# Patient Record
Sex: MALE | Race: WHITE | URBAN - METROPOLITAN AREA
[De-identification: names, ages, dates, MRNs, and addresses within clinical notes are randomized per-mention and may not be internally consistent; named-entity substitution may affect disease eponyms.]

---

## 2018-05-08 NOTE — PATIENT INSTRUCTIONS
Welcome to [facility], where we are committed to the art of inspired primary care.  Thank you for choosing us to be a part of your well-being.    The clinic is open Monday through Friday, 8:00 a.m.   5:00 p.m., and Saturdays from 8:00 a.m.   12:00 p.m.  After-hours questions are directed to our 24-hour nurse line, which can be reached by calling the clinic at [phone].  You can also contact the clinic through [employer], our online patient portal.  (Please allow 1-2 business days for a response via [employer].)  If you are not already enrolled in [employer], access instructions are below.    If you need a refill on your prescription, please contact the pharmacy where you filled it, and they will contact our clinic with the details of what is needed.  If your prescription is a controlled substance, you will have a conversation with your provider to determine if you would like to  your prescription at the clinic or have it mailed to your pharmacy.  Please allow 2-3 business days for all refill requests to be handled.    We look forward to providing you with great care!  Please let me know if you have any questions.  Thank you for allowing me to participate in your care.  It was my pleasure meeting you.  Naina Antunez PA-C    Preventive Health Recommendations  Male Ages 40 to 49    Yearly exam:             See your health care provider every year in order to  o   Review health changes.   o   Discuss preventive care.    o   Review your medicines if your doctor has prescribed any.    You should be tested each year for STDs (sexually transmitted diseases) if you re at risk.     Have a cholesterol test every 5 years.     Have a colonoscopy (test for colon cancer) if someone in your family has had colon cancer or polyps before age 50.     After age 45, have a diabetes test (fasting glucose). If you are at risk for diabetes, you should have this test every 3 years.      Talk with your  health care provider about whether or not a prostate cancer screening test (PSA) is right for you.    Shots: Get a flu shot each year. Get a tetanus shot every 10 years.     Nutrition:    Eat at least 5 servings of fruits and vegetables daily.     Eat whole-grain bread, whole-wheat pasta and brown rice instead of white grains and rice.     Talk to your provider about Calcium and Vitamin D.     Lifestyle    Exercise for at least 150 minutes a week (30 minutes a day, 5 days a week). This will help you control your weight and prevent disease.     Limit alcohol to one drink per day.     No smoking.     Wear sunscreen to prevent skin cancer.     See your dentist every six months for an exam and cleaning.

## 2018-05-08 NOTE — PROGRESS NOTES
SUBJECTIVE:   CC: Faisal Estes is an 47 year old male new patient who presents for preventative health visit. His last CPE was 4 years ago at [facility].      He has no specific concerns other than making sure he is up to date on health maintenance and will be turning 48 this year.    Healthy Habits:    Do you get at least three servings of calcium containing foods daily (dairy, green leafy vegetables, etc.)? no, taking calcium and/or vitamin D supplement: yes -     Amount of exercise or daily activities, outside of work: 3 day(s) per week 45-60 minutes    Problems taking medications regularly not applicable    Medication side effects: No    Have you had an eye exam in the past two years? No-planning to make an appointment    Do you see a dentist twice per year? yes    Do you have sleep apnea, excessive snoring or daytime drowsiness?no    Today's PHQ-2 Score:   PHQ-2 (  [employer]) 2018   Q1: Little interest or pleasure in doing things 0   Q2: Feeling down, depressed or hopeless 0   PHQ-2 Score 0       There are no active problems to display for this patient.      History reviewed. No pertinent past medical history.    Past Surgical History:   Procedure Laterality Date     ORTHOPEDIC SURGERY Left 2003    Medial knee replacement due to rugby injury      right wrist ganglion      30 years ago     TONSILLECTOMY       VASECTOMY         Family History   Problem Relation Age of Onset     CANCER Maternal Grandfather 70     stomach     Coronary Artery Disease Paternal Grandfather 70       Social History   Substance Use Topics     Smoking status: Never Smoker     Smokeless tobacco: Never Used     Alcohol use 3.0 oz/week     5 Standard drinks or equivalent per week      Comment: 5 drinks a week       Social History     Social History Narrative    Live with 3 children and wife. MOther in law was living with them but she recently .  One dog. Has his own marketing business.  Originally from [place].   "[place]oved [place] 6 [place].        Has a good support system.    Feels safe in all environments.    Wears seatbelt 100% of the time    Wears helmet while biking.    Dentist regular exams    Eye doctor regular visits. Wears glasses.    Mild snoring but no observed apnea.    Denies history of abuse, past or present, physical, sexual or emotional.    05/21/18    Naina Antunez PA-C           No current outpatient prescriptions on file.       Last PSA: No results found for: PSA    Reviewed orders with patient. Reviewed health maintenance and updated orders accordingly - Yes    Reviewed and updated as needed this visit by clinical staff  Tobacco  Allergies  Meds  Problems  Med Hx  Surg Hx  Fam Hx  Soc Hx        Reviewed and updated as needed this visit by Provider  Tobacco  Allergies  Meds  Problems  Med Hx  Surg Hx  Fam Hx  Soc Hx           ROS:  CONSTITUTIONAL: NEGATIVE for fever, chills, change in weight  INTEGUMENTARY/SKIN: NEGATIVE for worrisome rashes, moles or lesions  EYES: NEGATIVE for vision changes or irritation  ENT: NEGATIVE for ear, mouth and throat problems  RESP: NEGATIVE for significant cough or SOB  CV: NEGATIVE for chest pain, palpitations or peripheral edema  GI: NEGATIVE for nausea, abdominal pain, heartburn, or change in bowel habits   male: negative for dysuria, hematuria, decreased urinary stream, erectile dysfunction, urethral discharge  MUSCULOSKELETAL: NEGATIVE for significant arthralgias or myalgia  NEURO: NEGATIVE for weakness, dizziness or paresthesias  ENDOCRINE: NEGATIVE for temperature intolerance, skin/hair changes  HEME/ALLERGY/IMMUNE: NEGATIVE for bleeding problems  PSYCHIATRIC: NEGATIVE for changes in mood or affect  M: NEGATIVE for significant arthralgias or myalgia  N: NEGATIVE for weakness, dizziness or paresthesias  P: NEGATIVE for changes in mood or affect    OBJECTIVE:   /70  Pulse (!) 49  Temp 98.1  F (36.7  C) (Oral)  Ht 5' 10.32\" (178.6 cm)  Wt 198 lb 4 " oz (89.9 kg)  SpO2 98%  BMI 28.19 kg/m2  EXAM:  GENERAL: healthy, alert and no distress  EYES: Eyes grossly normal to inspection, PERRL and conjunctivae and sclerae normal.  Pinguecula bilaterally  HENT: ear canals and TM's normal, nose and mouth without ulcers or lesions  NECK: no adenopathy, no asymmetry, masses, or scars and thyroid normal to palpation. No Bruits  RESP: lungs clear to auscultation - no rales, rhonchi or wheezes  CV: regular rate and rhythm, normal S1 S2, no S3 or S4, no murmur, click or rub, no peripheral edema and peripheral pulses strong  ABDOMEN: soft, nontender, no hepatosplenomegaly, no masses and bowel sounds normal  MS: no gross musculoskeletal defects noted, no clubbing, edema or cyanosis of extremities.  Pulses = and appropriate bilaterally to DP and PT  SKIN: no suspicious lesions or rashes.  Multiple nevi . Seborrheic keratosis on back.  NEURO: Normal strength and tone, mentation intact and speech normal  PSYCH: mentation appears normal, affect normal/bright  LYMPH: no cervical, supraclavicular, axillary, or inguinal adenopathy    ASSESSMENT/PLAN:       ICD-10-CM    1. Routine general medical examination at a health care facility Z00.00 Comprehensive Metabolic Panel ([place])     Prostate spec antigen screen     Urinalysis ([place])             2. Screening for lipid disorders Z13.220 Lipid Profile              3. Special screening for malignant neoplasms, colon Z12.11    Recommend dermatology surveillance and skin check due to history of repeated sun burns and sun exposures as a child and young adult in [facility]. Derm clinic scheduling information given.    COUNSELING:  Reviewed preventive health counseling, as reflected in patient instructions  Special attention given to:        Regular exercise       Healthy diet/nutrition       Vision screening       Immunizations  TDAP up to date. Discussed Shingrix--wait until 50        Colon cancer screening--Cologard Rx given.        "Prostate cancer screening       Osteoporosis Prevention/Bone Health       Advance Care Planning-Information given and discussed       reports that he has never smoked. He has never used smokeless tobacco.    Estimated body mass index is 28.19 kg/(m^2) as calculated from the following:    Height as of this encounter: 5' 10.32\" (178.6 cm).    Weight as of this encounter: 198 lb 4 oz (89.9 kg).   Weight management plan: Discussed healthy diet and exercise guidelines and patient will follow up in 12 months in clinic to re-evaluate.    Counseling Resources:  ATP IV Guidelines  Pooled Cohorts Equation Calculator  FRAX Risk Assessment  ICSI Preventive Guidelines  Dietary Guidelines for Americans, 2010  USDA's MyPlate  ASA Prophylaxis  Lung CA Screening    Gaby Antunez PA-C  [facility]  "

## 2018-05-21 ENCOUNTER — OFFICE VISIT (OUTPATIENT)
Dept: FAMILY MEDICINE | Facility: CLINIC | Age: 48
End: 2018-05-21
Payer: COMMERCIAL

## 2018-05-21 VITALS
DIASTOLIC BLOOD PRESSURE: 70 MMHG | WEIGHT: 198.25 LBS | SYSTOLIC BLOOD PRESSURE: 113 MMHG | OXYGEN SATURATION: 98 % | BODY MASS INDEX: 28.38 KG/M2 | TEMPERATURE: 98.1 F | HEART RATE: 49 BPM | HEIGHT: 70 IN

## 2018-05-21 DIAGNOSIS — Z00.00 ROUTINE GENERAL MEDICAL EXAMINATION AT A HEALTH CARE FACILITY: Primary | ICD-10-CM

## 2018-05-21 DIAGNOSIS — Z13.220 SCREENING FOR LIPID DISORDERS: ICD-10-CM

## 2018-05-21 DIAGNOSIS — Z12.11 SPECIAL SCREENING FOR MALIGNANT NEOPLASMS, COLON: ICD-10-CM

## 2018-05-21 LAB
ALBUMIN SERPL-MCNC: 4.2 G/DL (ref 3.3–4.6)
ALP SERPL-CCNC: 58 U/L (ref 40–150)
ALT SERPL-CCNC: 28 U/L (ref 0–70)
AST SERPL-CCNC: 33 U/L (ref 0–55)
BILIRUB SERPL-MCNC: 0.7 MG/DL (ref 0.2–1.3)
BILIRUBIN UR: NEGATIVE
BLOOD UR: NEGATIVE
BUN SERPL-MCNC: 15 MG/DL (ref 5–24)
CALCIUM SERPL-MCNC: 9.2 MG/DL (ref 8.5–10.4)
CHLORIDE SERPLBLD-SCNC: 103 MMOL/L (ref 94–109)
CHOLEST SERPL-MCNC: 179 MG/DL
CO2 SERPL-SCNC: 29 MMOL/L (ref 20–32)
CREAT SERPL-MCNC: 1.1 MG/DL (ref 0.8–1.5)
EGFR CALCULATED (BLACK REFERENCE): 91.5
EGFR CALCULATED (NON BLACK REFERENCE): 75.6
GLUCOSE SERPL-MCNC: 94 MG/DL (ref 60–109)
GLUCOSE URINE: NEGATIVE
HDLC SERPL-MCNC: 45 MG/DL
KETONES UR QL: ABNORMAL
LDLC SERPL CALC-MCNC: 116 MG/DL
LEUKOCYTE ESTERASE UR: NEGATIVE
NITRITE UR QL STRIP: NEGATIVE
NONHDLC SERPL-MCNC: 134 MG/DL
PH UR STRIP: 5.5 [PH] (ref 5–7)
POTASSIUM SERPL-SCNC: 4 MMOL/L (ref 3.4–5.3)
PROT SERPL-MCNC: 7.8 G/DL (ref 6.8–8.8)
PROTEIN UR: NEGATIVE
PSA SERPL-ACNC: 2.06 UG/L (ref 0–4)
SODIUM SERPL-SCNC: 141 MMOL/L (ref 133–144)
SP GR UR STRIP: >=1.03
TRIGL SERPL-MCNC: 91 MG/DL
UROBILINOGEN UR STRIP-ACNC: ABNORMAL

## 2018-05-21 NOTE — MR AVS SNAPSHOT
After Visit Summary   5/21/2018    Faisal Estes    MRN: [id number]           Patient Information     Date Of Birth          1970        Visit Information        Provider Department      5/21/2018 10:00 AM Gaby Antunez PA-C [facility]        Today's Diagnoses     Routine general medical examination at a health care facility    -  1    Screening for lipid disorders        Screening for human immunodeficiency virus        Special screening for malignant neoplasms, colon          Care Instructions    Welcome to [facility], where we are committed to the art of inspired primary care.  Thank you for choosing us to be a part of your well-being.    The clinic is open Monday through Friday, 8:00 a.m. - 5:00 p.m., and Saturdays from 8:00 a.m. - 12:00 p.m.  After-hours questions are directed to our 24-hour nurse line, which can be reached by calling the clinic at [phone].  You can also contact the clinic through [employer], our online patient portal.  (Please allow 1-2 business days for a response via [employer].)  If you are not already enrolled in [employer], access instructions are below.    If you need a refill on your prescription, please contact the pharmacy where you filled it, and they will contact our clinic with the details of what is needed.  If your prescription is a controlled substance, you will have a conversation with your provider to determine if you would like to  your prescription at the clinic or have it mailed to your pharmacy.  Please allow 2-3 business days for all refill requests to be handled.    We look forward to providing you with great care!  Please let me know if you have any questions.  Thank you for allowing me to participate in your care.  It was my pleasure meeting you.  Naina Antunez PA-C    Preventive Health Recommendations  Male Ages 40 to 49    Yearly exam:             See your health care provider every year in  order to  o   Review health changes.   o   Discuss preventive care.    o   Review your medicines if your doctor has prescribed any.    You should be tested each year for STDs (sexually transmitted diseases) if you re at risk.     Have a cholesterol test every 5 years.     Have a colonoscopy (test for colon cancer) if someone in your family has had colon cancer or polyps before age 50.     After age 45, have a diabetes test (fasting glucose). If you are at risk for diabetes, you should have this test every 3 years.      Talk with your health care provider about whether or not a prostate cancer screening test (PSA) is right for you.    Shots: Get a flu shot each year. Get a tetanus shot every 10 years.     Nutrition:    Eat at least 5 servings of fruits and vegetables daily.     Eat whole-grain bread, whole-wheat pasta and brown rice instead of white grains and rice.     Talk to your provider about Calcium and Vitamin D.     Lifestyle    Exercise for at least 150 minutes a week (30 minutes a day, 5 days a week). This will help you control your weight and prevent disease.     Limit alcohol to one drink per day.     No smoking.     Wear sunscreen to prevent skin cancer.     See your dentist every six months for an exam and cleaning.              Follow-ups after your visit        Who to contact     Please call your clinic at [phone] to:    Ask questions about your health    Make or cancel appointments    Discuss your medicines    Learn about your test results    Speak to your doctor            Additional Information About Your Visit        [employer] Information     [employer] gives you secure access to your electronic health record. If you see a primary care provider, you can also send messages to your care team and make appointments. If you have questions, please call your primary care clinic.  If you do not have a primary care provider, please call [phone] and they will assist you.      [employer] is an electronic  "gateway that provides easy, online access to your medical records. With [employer], you can request a clinic appointment, read your test results, renew a prescription or communicate with your care team.     To access your existing account, please contact your [facility] or call [phone] for assistance.        Care EveryWhere ID     This is your Care EveryWhere ID. This could be used by other organizations to access your [facility] medical records  [id number]        Your Vitals Were     Pulse Temperature Height Pulse Oximetry BMI (Body Mass Index)       49 98.1  F (36.7  C) (Oral) 5' 10.32\" (178.6 cm) 98% 28.19 kg/m2        Blood Pressure from Last 3 Encounters:   05/21/18 113/70    Weight from Last 3 Encounters:   05/21/18 198 lb 4 oz (89.9 kg)              We Performed the Following     Comprehensive Metabolic Panel ([place])     Lipid Panel ([facility])     Prostate spec antigen screen     Urinalysis ([place])        Primary Care Provider Office Phone # Fax #    Gaby Antunez PA-C [phone]       [address] [place] [id number]        Equal Access to Services     [facility] Center[facility] : Hadii aad ku hadasho Soomaali, waaxda luqadaha, qaybta kaalmada adeegyada, kris milian . So [facility] [phone].    ATENCIÓN: Si habla español, tiene a ayers disposición servicios gratuitos de asistencia lingüística. Freya al [phone].    We comply with applicable federal civil rights laws and [place] laws. We do not discriminate on the basis of race, color, national origin, age, disability, sex, sexual orientation, or gender identity.            Thank you!     Thank you for choosing [facility]  for your care. Our goal is always to provide you with excellent care. Hearing back from our patients is one way we can continue to improve our services. Please take a few minutes to complete the written survey that you may receive in the mail after " your visit with us. Thank you!             Your Updated Medication List - Protect others around you: Learn how to safely use, store and throw away your medicines at www.disposemymeds.org.      Notice  As of 5/21/2018 11:03 AM    You have not been prescribed any medications.

## 2018-07-11 ENCOUNTER — TELEPHONE (OUTPATIENT)
Dept: DERMATOLOGY | Facility: CLINIC | Age: 48
End: 2018-07-11

## 2018-07-11 NOTE — TELEPHONE ENCOUNTER
Dermatology Pre-visit Call:    Reason for visit : Skin Check      Any personal history of skin cancers: No    Was the patient referred: Yes    If the patient was referred, are records obtained: No    Has the patient seen a dermatologist in the past: No    Patient Reminders Given:  --Please, make sure you bring an updated list of your medications.   --Plan on being in our facility for approximately one hour, this includes the registration process, office visit, education and check-out process.  If you are having a procedure, more time may be required.     --If you are having a procedure, please, present 15 minutes early.  --Location reviewed.   --If you need to cancel or reschedule, call XXXX  --We look forward to seeing you in Dermatology Clinic.

## 2018-07-18 ENCOUNTER — OFFICE VISIT (OUTPATIENT)
Dept: DERMATOLOGY | Facility: CLINIC | Age: 48
End: 2018-07-18
Payer: COMMERCIAL

## 2018-07-18 DIAGNOSIS — D48.5 NEOPLASM OF UNCERTAIN BEHAVIOR OF SKIN: Primary | ICD-10-CM

## 2018-07-18 DIAGNOSIS — D22.9 MULTIPLE BENIGN NEVI: ICD-10-CM

## 2018-07-18 DIAGNOSIS — L82.1 SEBORRHEIC KERATOSES: ICD-10-CM

## 2018-07-18 RX ORDER — LIDOCAINE HYDROCHLORIDE AND EPINEPHRINE 10; 10 MG/ML; UG/ML
1 INJECTION, SOLUTION INFILTRATION; PERINEURAL ONCE
Qty: 1 ML | Refills: 0 | OUTPATIENT
Start: 2018-07-18 | End: 2018-07-18

## 2018-07-18 ASSESSMENT — PAIN SCALES - GENERAL
PAINLEVEL: NO PAIN (0)
PAINLEVEL: NO PAIN (0)

## 2018-07-18 NOTE — NURSING NOTE
Dermatology Rooming Note    Faisal Estes's goals for this visit include:   Chief Complaint   Patient presents with     Skin Check     Faisal is here today for a skin check- no areas of concern.      Jess Ayala MA

## 2018-07-18 NOTE — NURSING NOTE
Lidocaine 1%  0.5mL once for one use, starting 7/18/2018 ending 7/18/2018,  2mL disp, R-0, injection  Injected by Jess Ayala MA

## 2018-07-18 NOTE — PATIENT INSTRUCTIONS

## 2018-07-18 NOTE — MR AVS SNAPSHOT
After Visit Summary   7/18/2018    Faisal Estes    MRN: [id number]           Patient Information     Date Of Birth          1970        Visit Information        Provider Department      7/18/2018 2:00 PM Meghan Hodgson PA-C [facility] D[facility]        Today's Diagnoses     Neoplasm of uncertain behavior of skin    -  1      Care Instructions    Wound Care After a Biopsy    What is a skin biopsy?  A skin biopsy allows the doctor to examine a very small piece of tissue under the microscope to determine the diagnosis and the best treatment for the skin condition. A local anesthetic (numbing medicine)  is injected with a very small needle into the skin area to be tested. A small piece of skin is taken from the area. Sometimes a suture (stitch) is used.     What are the risks of a skin biopsy?  I will experience scar, bleeding, swelling, pain, crusting and redness. I may experience incomplete removal or recurrence. Risks of this procedure are excessive bleeding, bruising, infection, nerve damage, numbness, thick (hypertrophic or keloidal) scar and non-diagnostic biopsy.    How should I care for my wound for the first 24 hours?    Keep the wound dry and covered for 24 hours    If it bleeds, hold direct pressure on the area for 15 minutes. If bleeding does not stop then go to the emergency room    Avoid strenuous exercise the first 1-2 days or as your doctor instructs you    How should I care for the wound after 24 hours?    After 24 hours, remove the bandage    You may bathe or shower as normal    If you had a scalp biopsy, you can shampoo as usual and can use shower water to clean the biopsy site daily    Clean the wound twice a day with gentle soap and water    Do not scrub, be gentle    Apply white petroleum/Vaseline after cleaning the wound with a cotton swab or a clean finger, and keep the site covered with a Bandaid /bandage. Bandages are not necessary with a scalp biopsy    If you are  unable to cover the site with a Bandaid /bandage, re-apply ointment 2-3 times a day to keep the site moist. Moisture will help with healing    Avoid strenuous activity for first 1-2 days    Avoid lakes, rivers, pools, and oceans until the stitches are removed or the site is healed    How do I clean my wound?    Wash hands thoroughly with soap or use hand  before all wound care    Clean the wound with gentle soap and water    Apply white petroleum/Vaseline  to wound after it is clean    Replace the Bandaid /bandage to keep the wound covered for the first few days or as instructed by your doctor    If you had a scalp biopsy, warm shower water to the area on a daily basis should suffice    What should I use to clean my wound?     Cotton-tipped applicators (Qtips )    White petroleum jelly (Vaseline ). Use a clean new container and use Q-tips to apply.    Bandaids   as needed    Gentle soap     How should I care for my wound long term?    Do not get your wound dirty    Keep up with wound care for one week or until the area is healed.    A small scab will form and fall off by itself when the area is completely healed. The area will be red and will become pink in color as it heals. Sun protection is very important for how your scar will turn out. Sunscreen with an SPF 30 or greater is recommended once the area is healed.    You should have some soreness but it should be mild and slowly go away over several days. Talk to your doctor about using tylenol for pain,    When should I call my doctor?  If you have increased:     Pain or swelling    Pus or drainage (clear or slightly yellow drainage is ok)    Temperature over 100F    Spreading redness or warmth around wound    When will I hear about my results?  The biopsy results can take 2-3 weeks to come back. The clinic will call you with the results, send you a [employer] message, or have you schedule a follow-up clinic or phone time to discuss the results. Contact our  clinics if you do not hear from us in 3 weeks.     Who should I call with questions?    [facility]: [phone]     [facility]: [phone]    For urgent needs outside of business hours call the [facility] at [phone] and ask for the dermatology resident on call              Follow-ups after your visit        Who to contact     Please call your clinic at [phone] to:    Ask questions about your health    Make or cancel appointments    Discuss your medicines    Learn about your test results    Speak to your doctor            Additional Information About Your Visit        [employer] Information     [employer] gives you secure access to your electronic health record. If you see a primary care provider, you can also send messages to your care team and make appointments. If you have questions, please call your primary care clinic.  If you do not have a primary care provider, please call [phone] and they will assist you.      [employer] is an electronic gateway that provides easy, online access to your medical records. With [employer], you can request a clinic appointment, read your test results, renew a prescription or communicate with your care team.     To access your existing account, please contact your [facility] or call [phone] for assistance.        Care EveryWhere ID     This is your Care EveryWhere ID. This could be used by other organizations to access your [facility] medical records  [id number]         Blood Pressure from Last 3 Encounters:   05/21/18 113/70    Weight from Last 3 Encounters:   05/21/18 89.9 kg (198 lb 4 oz)              We Performed the Following     BIOPSY SKIN/SUBQ/MUC MEM, SINGLE LESION     Dermatological path order and indications          Today's Medication Changes          These changes are accurate as of 7/18/18  2:56 PM.  If you have any questions, ask your nurse or doctor.                Start taking these medicines.        Dose/Directions    lidocaine 1% with EPINEPHrine 1:100,000 1 %-1:042841 injection   Used for:  Neoplasm of uncertain behavior of skin   Started by:  Meghan Hodgson PA-C        Dose:  1 mL   Inject 1 mL into the skin once for 1 dose   Quantity:  1 mL   Refills:  0            Where to get your medicines      Some of these will need a paper prescription and others can be bought over the counter.  Ask your nurse if you have questions.     You don't need a prescription for these medications     lidocaine 1% with EPINEPHrine 1:100,000 1 %-1:193805 injection                Primary Care Provider Office Phone # Fax #    Gaby A EMERSON Antunez [phone]       [address] [place] [address]        Equal Access to Services     RAI TUCKER : Rajan henryo Sobyron, waaxda luqadaha, qaybta kaalmada ademichelleyada, kris milian . [facility] [phone].    ATENCIÓN: Si habla español, tiene a ayers disposición servicios gratuitos de asistencia lingüística. [facility] [phone].    We comply with applicable federal civil rights laws and [place] laws. We do not discriminate on the basis of race, color, national origin, age, disability, sex, sexual orientation, or gender identity.            Thank you!     Thank you for choosing [facility] DERMATOLOGY  for your care. Our goal is always to provide you with excellent care. Hearing back from our patients is one way we can continue to improve our services. Please take a few minutes to complete the written survey that you may receive in the mail after your visit with us. Thank you!             Your Updated Medication List - Protect others around you: Learn how to safely use, store and throw away your medicines at www.disposemymeds.org.          This list is accurate as of 7/18/18  2:56 PM.  Always use your most recent med list.                   Brand Name Dispense Instructions for  use Diagnosis    lidocaine 1% with EPINEPHrine 1:100,000 1 %-1:537668 injection     1 mL    Inject 1 mL into the skin once for 1 dose    Neoplasm of uncertain behavior of skin

## 2018-07-18 NOTE — PROGRESS NOTES
"[facility] Dermatology Note      Dermatology Problem List:  1.NUB - s/p bx 7/18/18    CC:   Chief Complaint   Patient presents with     Skin Check     Faisal is here today for a skin check- no areas of concern.          Encounter Date: Jul 18, 2018    History of Present Illness:  Mr. Faisal Estes is a 47 year old male who is new to the clinic who presents for a FBSE. He has no specific concerns. His PCP recommended he have a skin exam because noticed a few \"things on his back\" that should be checked. The patient denies painful, itching, tingling or bleeding lesions unless otherwise noted. He has no hx of skin cancer. He is otherwise doing well.       Past Medical History:   There is no problem list on file for this patient.    History reviewed. No pertinent past medical history.  Past Surgical History:   Procedure Laterality Date     ORTHOPEDIC SURGERY Left 2003    Medial knee replacement due to rugby injury      right wrist ganglion      30 years ago     TONSILLECTOMY       VASECTOMY         Social History:  The patient works as a . The patient denies use of tanning beds.    Family History:  There is no family history of skin cancer. There is no family history of melanoma.    Medications:  No current outpatient prescriptions on file.     No Known Allergies      Review of Systems:  -Constitutional: The patient denies fatigue, fevers, chills, unintended weight loss, and night sweats.  -Skin: As above in HPI. No additional skin concerns.  -Heme/Lymph: no concerning bumps, no bleeding or bruising problems    Physical exam:  Vitals: There were no vitals taken for this visit.  GEN: This is a well developed, well-nourished male in no acute distress, in a pleasant mood.    SKIN: Full skin, which includes the head/face, both arms, chest, back, abdomen,both legs, genitalia and/or groin buttocks, digits and/or nails, was examined.  -There is a waxy stuck on tan to brown papule on " the right mid back.  -Multiple regular brown pigmented macules and papules are identified on the trunk and extremities.   -Alvarez's skin type II,   -No other lesions of concern on areas examined.     Impression/Plan:  1. Neoplasm of uncertain behavior on the left mid lower back. The differential diagnosis includes MM vs DN vs other benign nevus.     Shave biopsy:  After discussion of benefits and risks including but not limited to bleeding/bruising, pain/swelling, infection, scar, incomplete removal, nerve damage/numbness, recurrence, and non-diagnostic biopsy, written consent, verbal consent and photographs were obtained. Time-out was performed. The area was cleaned with isopropyl alcohol.  was injected to obtain adequate anesthesia of the lesion on the mid lower back. 1ml of 1% lidocaine was injected to obtain adequate anesthesia. A  shave biopsy was performed. Hemostasis was achieved with aluminium chloride. Vaseline and a sterile dressing were applied. The patient tolerated the procedure and no complications were noted. The patient was provided with verbal and written post care instructions.      2. Multiple clinically benign nevi on the trunk and extremities    ABCD's of melanoma were reviewed with patient and handout provided.     Sunscreen: Apply 20 minutes prior to going outdoors and reapply every two hours, when wet or sweating. We recommend using an SPF 30 or higher, and to use one that is water resistant.       3. Seborrheic keratosis, non irritated - right mid back    No further intervention required. Patient to report changes.     CC Dr. Arcos on close of this encounter.  Follow-up in 1 year, earlier for new or changing lesions.       Staff Involved:  Staff Only  All risks, benefits and alternatives were discussed with patient.  Patient is in agreement and understands the assessment and plan.  All questions were answered.    Meghan Hodgson PA-C  [facility]  [facility]: Phone: [phone], Fax: [phone]

## 2018-07-18 NOTE — LETTER
"7/18/2018       RE: Faisal Estes  [address] [place] [address]     Dear Colleague,    Thank you for referring your patient, Faisal Estes, to the [facility] DERMATOLOGY at [facility]. Please see a copy of my visit note below.    [facility] Dermatology Note      Dermatology Problem List:  1.NUB - s/p bx 7/18/18    CC:   Chief Complaint   Patient presents with     Skin Check     Faisal is here today for a skin check- no areas of concern.          Encounter Date: Jul 18, 2018    History of Present Illness:  Mr. Faisal Estes is a 47 year old male who is new to the clinic who presents for a FBSE. He has no specific concerns. His PCP recommended he have a skin exam because noticed a few \"things on his back\" that should be checked. The patient denies painful, itching, tingling or bleeding lesions unless otherwise noted. He has no hx of skin cancer. He is otherwise doing well.       Past Medical History:   There is no problem list on file for this patient.    History reviewed. No pertinent past medical history.  Past Surgical History:   Procedure Laterality Date     ORTHOPEDIC SURGERY Left 2003    Medial knee replacement due to rugby injury      right wrist ganglion      30 years ago     TONSILLECTOMY       VASECTOMY         Social History:  The patient works as a . The patient denies use of tanning beds.    Family History:  There is no family history of skin cancer. There is no family history of melanoma.    Medications:  No current outpatient prescriptions on file.     No Known Allergies      Review of Systems:  -Constitutional: The patient denies fatigue, fevers, chills, unintended weight loss, and night sweats.  -Skin: As above in HPI. No additional skin concerns.  -Heme/Lymph: no concerning bumps, no bleeding or bruising problems    Physical exam:  Vitals: There were no vitals taken for this visit.  GEN: This is a well " developed, well-nourished male in no acute distress, in a pleasant mood.    SKIN: Full skin, which includes the head/face, both arms, chest, back, abdomen,both legs, genitalia and/or groin buttocks, digits and/or nails, was examined.  -There is a waxy stuck on tan to brown papule on the right mid back.  -Multiple regular brown pigmented macules and papules are identified on the trunk and extremities.   -Alvarez's skin type II,   -No other lesions of concern on areas examined.     Impression/Plan:  1. Neoplasm of uncertain behavior on the left mid lower back. The differential diagnosis includes MM vs DN vs other benign nevus.     Shave biopsy:  After discussion of benefits and risks including but not limited to bleeding/bruising, pain/swelling, infection, scar, incomplete removal, nerve damage/numbness, recurrence, and non-diagnostic biopsy, written consent, verbal consent and photographs were obtained. Time-out was performed. The area was cleaned with isopropyl alcohol.  was injected to obtain adequate anesthesia of the lesion on the mid lower back. 1ml of 1% lidocaine was injected to obtain adequate anesthesia. A  shave biopsy was performed. Hemostasis was achieved with aluminium chloride. Vaseline and a sterile dressing were applied. The patient tolerated the procedure and no complications were noted. The patient was provided with verbal and written post care instructions.      2. Multiple clinically benign nevi on the trunk and extremities    ABCD's of melanoma were reviewed with patient and handout provided.     Sunscreen: Apply 20 minutes prior to going outdoors and reapply every two hours, when wet or sweating. We recommend using an SPF 30 or higher, and to use one that is water resistant.       3. Seborrheic keratosis, non irritated - right mid back    No further intervention required. Patient to report changes.     CC Dr. Arcos on close of this encounter.  Follow-up in 1 year, earlier for new or  changing lesions.       Staff Involved:  Staff Only  All risks, benefits and alternatives were discussed with patient.  Patient is in agreement and understands the assessment and plan.  All questions were answered.    Meghan Hodgson PA-C  [facility] [place]: Phone: [phone], Fax: [phone]

## 2018-07-26 LAB — COPATH REPORT: NORMAL

## 2018-11-27 ENCOUNTER — TELEPHONE (OUTPATIENT)
Dept: FAMILY MEDICINE | Facility: CLINIC | Age: 48
End: 2018-11-27

## 2018-11-27 DIAGNOSIS — Z01.89 ENCOUNTER FOR LABORATORY TEST: Primary | ICD-10-CM

## 2018-11-27 NOTE — TELEPHONE ENCOUNTER
Spoke to Lissette - Dr Terrazas, ortho provider does want pt to have multiple labs drawn at [place] , as they are unsure why pt continues to have shoulder pain. Lab orders received from [facility] - pt will have labs here today - results will be faxed to that clinic by [facility].  Renetta Guerrero RN  Care Coordinator  [facility]

## 2018-11-27 NOTE — TELEPHONE ENCOUNTER
----- Message from Veronique Thomason sent at 11/26/2018  2:20 PM CST -----  Regarding: Question for Ana Antunez.  Contact: [phone]  Has this pt done any recent lab work and what were the labs done?   Lissette from [facility] is calling, they see this pt also. They want to do labs so don't want to overlap. Please call Lissette to let her know the recent labs at [phone].     Thanks -  Veronique    Please DO NOT send this message and/or reply back to sender.  Call Center Representatives DO NOT respond to messages.

## 2019-05-23 ENCOUNTER — TRANSFERRED RECORDS (OUTPATIENT)
Dept: HEALTH INFORMATION MANAGEMENT | Facility: CLINIC | Age: 49
End: 2019-05-23

## 2019-07-29 ENCOUNTER — OFFICE VISIT (OUTPATIENT)
Dept: FAMILY MEDICINE | Facility: CLINIC | Age: 49
End: 2019-07-29
Payer: COMMERCIAL

## 2019-07-29 VITALS
OXYGEN SATURATION: 97 % | TEMPERATURE: 97.7 F | RESPIRATION RATE: 16 BRPM | WEIGHT: 212.75 LBS | DIASTOLIC BLOOD PRESSURE: 75 MMHG | SYSTOLIC BLOOD PRESSURE: 111 MMHG | HEART RATE: 68 BPM | BODY MASS INDEX: 30.25 KG/M2

## 2019-07-29 DIAGNOSIS — Z23 NEED FOR VACCINATION FOR PNEUMOCOCCUS: ICD-10-CM

## 2019-07-29 DIAGNOSIS — D84.9 IMMUNOSUPPRESSION (H): ICD-10-CM

## 2019-07-29 DIAGNOSIS — R21 RASH: Primary | ICD-10-CM

## 2019-07-29 RX ORDER — ADALIMUMAB 40MG/0.4ML
40 KIT SUBCUTANEOUS
Refills: 1 | COMMUNITY
Start: 2019-07-05

## 2019-07-29 NOTE — NURSING NOTE
Screening Questionnaire for Adult Immunization    Are you sick today?   No   Do you have allergies to medications, food, a vaccine component or latex?   No   Have you ever had a serious reaction after receiving a vaccination?   No   Do you have a long-term health problem with heart disease, lung disease, asthma, kidney disease, metabolic disease (e.g. diabetes), anemia, or other blood disorder?   No   Do you have cancer, leukemia, HIV/AIDS, or any other immune system problem?   No   In the past 3 months, have you taken medications that affect  your immune system, such as prednisone, other steroids, or anticancer drugs; drugs for the treatment of rheumatoid arthritis, Crohn s disease, or psoriasis; or have you had radiation treatments?   No   Have you had a seizure, or a brain or other nervous system problem?   No   During the past year, have you received a transfusion of blood or blood     products, or been given immune (gamma) globulin or antiviral drug?   No   For women: Are you pregnant or is there a chance you could become        pregnant during the next month?   No   Have you received any vaccinations in the past 4 weeks?   No     Immunization questionnaire answers were all negative.        Per orders of Dr. Alexander, injection of PCV 13 given by Reanna Munoz. Patient instructed to remain in clinic for 15 minutes afterwards, and to report any adverse reaction to me immediately.       Screening performed by Reanna Munoz on 7/29/2019 at 2:18 PM.

## 2019-07-29 NOTE — NURSING NOTE
48 year old  Chief Complaint   Patient presents with     Derm Problem     x 4 days; injected humira for 1st time 2 weeks ago       Blood pressure 111/75, pulse 68, temperature 97.7  F (36.5  C), temperature source Oral, resp. rate 16, weight 96.5 kg (212 lb 12 oz), SpO2 97 %. Body mass index is 30.25 kg/m .  There is no problem list on file for this patient.      Wt Readings from Last 2 Encounters:   07/29/19 96.5 kg (212 lb 12 oz)   05/21/18 89.9 kg (198 lb 4 oz)     BP Readings from Last 3 Encounters:   07/29/19 111/75   05/21/18 113/70         Current Outpatient Medications   Medication     HUMIRA *CF* PEN 40 MG/0.4ML pen kit     No current facility-administered medications for this visit.        Social History     Tobacco Use     Smoking status: Never Smoker     Smokeless tobacco: Never Used   Substance Use Topics     Alcohol use: Yes     Alcohol/week: 3.0 oz     Types: 5 Standard drinks or equivalent per week     Comment: 5 drinks a week     Drug use: No       Health Maintenance Due   Topic Date Due     COLONOSCOPY  12/12/1978     HIV SCREENING  12/12/1983     PHQ-2  01/01/2019     PREVENTIVE CARE VISIT  05/21/2019     ZOSTER IMMUNIZATION (1 of 2) 12/12/2018       No results found for: PAP      July 29, 2019 1:48 PM

## 2019-07-29 NOTE — PROGRESS NOTES
"  SUBJECTIVE:   Faisal Estes is a 48 year old male who presents to clinic today for a return visit.    # Rash  - started 3 days ago (Friday evening)  - had been swimming, got out of the pool, about 45-60 minutes later starting feeling a \"tingling\" on legs and arms  - developed rash on legs and then later on arms and torso  - not particularly itching or painful  - also noticed redness and a little warmth around injection site on anterior right thigh  - this redness/warmth has now resolved  - rash also seems to be going away  - never had eye redness/pain, mouth sore, sore throat, dysuria.     - has history of RA followed by Dr. Chris Demarco, previously on methotrexate (last dose 7/17)  - recently started on Humira, first dose on 7/21/19  - was due to take second dose of Humira yesterday but did not take    ROS: Denies fevers, chills, chest pain, difficulty breathing, abdominal pain    There is no problem list on file for this patient.    Current Outpatient Medications   Medication     HUMIRA *CF* PEN 40 MG/0.4ML pen kit     No current facility-administered medications for this visit.        I have reviewed the patient's relevant past medical history.     OBJECTIVE:   /75 (BP Location: Right arm, Patient Position: Sitting, Cuff Size: Adult Large)   Pulse 68   Temp 97.7  F (36.5  C) (Oral)   Resp 16   Wt 96.5 kg (212 lb 12 oz)   SpO2 97%   BMI 30.25 kg/m      Constitutional: well-appearing, appears stated age  Eyes: conjunctivae without erythema, sclera anicteric.   Psych: affect is full and appropriate, speech is fluent and non-pressured  Skin: erythematous reticular flat blanching rash both ankle , small areas visible on both upper arms, and across chest.           ASSESSMENT AND PLAN:     (R21) Rash  (primary encounter diagnosis)  Comment: Differential includes urticaria, contact dermatitis, drug reaction from Humira, viral exanthem. Not pruritic and appearance not typical for " urticarial/histamine-mediated rash. Onset was more rapid after exiting pool than I might expect for an allergic contact dermatitis - could be a chemical irritant. Given the pronounced and delayed local reaction in the area of his Humira injection at the same time as the onset of the rest of the rash, I would suspect this is related to his Humira. I advised Faisal that he should talk with Dr. Demarco to see if they believe this is connected.  Given that the rash is improving, I would not plan other treatment or evaluation at this time. The only lingering question is whether it is appropriate for him to continue Humira.    No mucosal membrane involvement.  No symptoms of anaphylaxis.  Rash not consistent with LCV.      (D89.9) Immunosuppression (H)  (Z23) Need for vaccination for pneumococcus  Comment: Due to Humira/Methotrexate. Return >8 weeks from now for pneumovax (PCV23).   Plan: VACCINE ADMINISTRATION, INITIAL, Pneumococcal         vaccine 13 valent PCV13 IM (Prevnar) [[id number]]         Nilo Alexander   [facility]  07/29/2019, 2:04 PM

## 2019-10-09 ENCOUNTER — OFFICE VISIT (OUTPATIENT)
Dept: FAMILY MEDICINE | Facility: CLINIC | Age: 49
End: 2019-10-09
Payer: COMMERCIAL

## 2019-10-09 VITALS
RESPIRATION RATE: 16 BRPM | OXYGEN SATURATION: 97 % | HEART RATE: 57 BPM | BODY MASS INDEX: 30.06 KG/M2 | HEIGHT: 70 IN | TEMPERATURE: 97.6 F | WEIGHT: 210 LBS | DIASTOLIC BLOOD PRESSURE: 74 MMHG | SYSTOLIC BLOOD PRESSURE: 111 MMHG

## 2019-10-09 DIAGNOSIS — R35.0 URINARY FREQUENCY: Primary | ICD-10-CM

## 2019-10-09 ASSESSMENT — MIFFLIN-ST. JEOR: SCORE: 1823.8

## 2019-10-09 NOTE — PROGRESS NOTES
SUBJECTIVE:   Faisal Estes is a 48 year old male who presents to clinic today to discuss the following problem(s).    Increased Urination:  - feels like he is aware of going to the bathroom more frequently in the morning to early afternoon  - denies increased urination at night  - denies any hesitancy or decreased stream  - thinks the amount of urination with each void has not noticeably decreased  - no pain with urination  - no gross blood  - no change in smell  - recently seen at the [facility] (executive physical)  - also has a known history or RA (takes humira for this, wonders if this could be adding to urinary issues)  - due to the RA, patient thinks the [facility] physical was a little more in depth, nevertheless everything came back unremarkable  - had a colonoscopy at that time which was negative  - had a JUANCHO at that point and was told it was also normal with no mention of BPH      ROS:   CONSTITUTIONAL: NEGATIVE for chills, fatigue, fever,sweats and weight loss  RESP: NEGATIVE for cough, hemoptysis, SOB/dyspnea and wheezing  CV: NEGATIVE for chest pain/chest pressure, dyspnea on exertion  GI: NEGATIVE for abdominal pain, diarrhea, dysphagia and nausea  : Positive for increased urination as detailed above  NEURO: NEGATIVE for paresthesias   PSYCHIATRIC: NEGATIVE    History reviewed. No pertinent past medical history.     Past Surgical History:   Procedure Laterality Date     ORTHOPEDIC SURGERY Left 2003    Medial knee replacement due to rugby injury      right wrist ganglion      30 years ago     TONSILLECTOMY       VASECTOMY       Family History   Problem Relation Age of Onset     Cancer Maternal Grandfather 70        stomach     Coronary Artery Disease Paternal Grandfather 70     Melanoma No family hx of      Skin Cancer No family hx of      Social History     Tobacco Use     Smoking status: Never Smoker     Smokeless tobacco: Never Used   Substance Use Topics     Alcohol use: Yes     Alcohol/week: 5.0  "standard drinks     Types: 5 Standard drinks or equivalent per week     Comment: 5 drinks a week     Drug use: No     Social History     Patient does not qualify to have social determinant information on file (likely too young).   Social History Narrative    Live with 3 children and wife. MOther in law was living with them but she recently .  One dog. Has his own marketing business.  Originally from [facility].  Moved to  6 years ago.        Has a good support system.    Feels safe in all environments.    Wears seatbelt 100% of the time    Wears helmet while biking.    Dentist regular exams    Eye doctor regular visits. Wears glasses.    Mild snoring but no observed apnea.    Denies history of abuse, past or present, physical, sexual or emotional.    16    Naina Antunez PA-C       Current Outpatient Medications   Medication     HUMIRA *CF* PEN 40 MG/0.4ML pen kit     No current facility-administered medications for this visit.      I have reviewed the patient's past medical, surgical, family, and social history.     OBJECTIVE:   /74   Pulse 57   Temp 97.6  F (36.4  C) (Oral)   Resp 16   Ht 1.786 m (5' 10.32\")   Wt 95.3 kg (210 lb)   SpO2 97%   BMI 29.86 kg/m      Constitutional: well-appearing, appears stated age  Eyes: conjunctivae without erythema, sclera anicteric.   Cardiac: regular rate and rhythm, normal S1/S2, no murmur/rubs/gallops  Respiratory: lungs clear to auscultation bilaterally, normal work of breathing, no wheezes/crackles  GI/:  Skin: no rashes, lesions, or wounds  Psych: affect is full and appropriate, speech is fluent and non-pressured    ASSESSMENT AND PLAN:     Faisal was seen today for urinary problem.    Diagnoses and all orders for this visit:    Urinary frequency    In review of patient's history with my exam, I have low concern for enlarged prostate/ureteral obstruction, overactive bladder, bladder spasm, asymptomatic UTI or diabetes. I suspect symptoms " are due in large part to increased caffeine/fluid intake in the mornings. Will continue to watch for now. I hesitate to consider 5 alpha reductase inhibitor today given my assessment that there does not appear to be enlargement of the prostate. If symptoms continue to bother patient I would plan to refer to urology for more in depth assessment.      Alexis Avila MD  [facility]  10/09/2019, 8:16 AM

## 2019-10-09 NOTE — NURSING NOTE
"48 year old  Chief Complaint   Patient presents with     Urinary Problem     pt reports he is going to the bathroom a lot more than normal, has been a problem for about 2 months.       Blood pressure 111/74, pulse 57, temperature 97.6  F (36.4  C), temperature source Oral, resp. rate 16, height 1.786 m (5' 10.32\"), weight 95.3 kg (210 lb), SpO2 97 %. Body mass index is 29.86 kg/m .  There is no problem list on file for this patient.      Wt Readings from Last 2 Encounters:   10/09/19 95.3 kg (210 lb)   07/29/19 96.5 kg (212 lb 12 oz)     BP Readings from Last 3 Encounters:   10/09/19 111/74   07/29/19 111/75   05/21/18 113/70         Current Outpatient Medications   Medication     HUMIRA *CF* PEN 40 MG/0.4ML pen kit     No current facility-administered medications for this visit.        Social History     Tobacco Use     Smoking status: Never Smoker     Smokeless tobacco: Never Used   Substance Use Topics     Alcohol use: Yes     Alcohol/week: 5.0 standard drinks     Types: 5 Standard drinks or equivalent per week     Comment: 5 drinks a week     Drug use: No       Health Maintenance Due   Topic Date Due     COLONOSCOPY  12/12/1978     HIV SCREENING  12/12/1983     PREVENTIVE CARE VISIT  05/21/2019     INFLUENZA VACCINE (1) 09/01/2019     ZOSTER IMMUNIZATION (1 of 2) 12/12/2018       No results found for: PAP      October 9, 2019 8:13 AM  "

## 2020-01-16 ENCOUNTER — OFFICE VISIT (OUTPATIENT)
Dept: FAMILY MEDICINE | Facility: CLINIC | Age: 50
End: 2020-01-16
Payer: COMMERCIAL

## 2020-01-16 VITALS
HEART RATE: 46 BPM | DIASTOLIC BLOOD PRESSURE: 74 MMHG | WEIGHT: 210.75 LBS | BODY MASS INDEX: 30.17 KG/M2 | OXYGEN SATURATION: 98 % | SYSTOLIC BLOOD PRESSURE: 118 MMHG | HEIGHT: 70 IN | RESPIRATION RATE: 18 BRPM | TEMPERATURE: 97.7 F

## 2020-01-16 DIAGNOSIS — R07.9 CHEST PAIN, UNSPECIFIED TYPE: Primary | ICD-10-CM

## 2020-01-16 PROBLEM — M06.9 RHEUMATOID ARTHRITIS (H): Status: ACTIVE | Noted: 2020-01-16

## 2020-01-16 PROBLEM — E78.00 ELEVATED LOW DENSITY LIPOPROTEIN (LDL) CHOLESTEROL LEVEL: Status: ACTIVE | Noted: 2019-03-18

## 2020-01-16 PROBLEM — R73.01 IMPAIRED FASTING GLUCOSE: Status: ACTIVE | Noted: 2019-03-18

## 2020-01-16 ASSESSMENT — MIFFLIN-ST. JEOR: SCORE: 1820.34

## 2020-01-16 NOTE — PROGRESS NOTES
"  SUBJECTIVE:   Faisal Estes is a 49 year old male who presents to clinic today for a return visit.    # Chest Pain  - woke up at 0100 this morning with chest pressure (\"like someone sitting on my chest\")  - sat up on side of bed, came and went a few times over 5 minutes  - noticed that laying down would make it worse  - eventually just got up and did some internet reading about what this could be  - tried a few home remedies (baking soda, apple-cider vinegar)  - was feeling fine by this point, went back to bed, and when he laid down pressure returned so he stayed up  - drank wally tea, felt fine  - went back to be around 0300 and went to sleep    - has never had similar pain before  - felt a little tired today from lack of sleep but otherwise feels fine  - felt a little short of breath during episodes but unsure if related to chest or related to anxiety over thinking the symptoms could be coming from  - no palpitations  - no lightheadedness  - no nausea  - no chest pain/pressure since he was able to return to sleep  - no recent illness  - no coughing  - no history of reflux or heart burn  - no history of VTE, either personally or in family  - no recent international flights, no surgeries  - no leg swelling    - plays paddle twice a week and tennis once a week, including this past week, and feels fine during this    - had a normal stress ECG in March 2019 as part of [place] executive physical. Records for this are not available in Care Everywhere but a cardiologist note from that same day does report that it was a normal test.     ROS: Denies fevers, chills, difficulty breathing, abdominal pain    Patient Active Problem List   Diagnosis     Rheumatoid arthritis (H)     Impaired fasting glucose     Elevated low density lipoprotein (LDL) cholesterol level     Current Outpatient Medications   Medication     HUMIRA *CF* PEN 40 MG/0.4ML pen kit     No current facility-administered medications for this visit.        I " "have reviewed the patient's relevant past medical history.     OBJECTIVE:   /74 (BP Location: Right arm, Patient Position: Sitting, Cuff Size: Adult Large)   Pulse (!) 46   Temp 97.7  F (36.5  C) (Oral)   Resp 18   Ht 1.783 m (5' 10.2\")   Wt 95.6 kg (210 lb 12 oz)   SpO2 98%   BMI 30.07 kg/m      Constitutional: well-appearing, appears stated age  Eyes: conjunctivae without erythema, sclera anicteric.   Cardiac: regular rate and rhythm, faint (1-2/6) early systolic murmur heard throughout the precordium but loudest at RUSB. No friction rubs. No lower extremity pitting edema.   Respiratory: lungs clear to auscultation bilaterally, normal work of breathing, no wheezes/crackles. No chest wall tenderness.   Skin: no rashes, lesions, or wounds  Psych: affect is full and appropriate, speech is fluent and non-pressured    3/18/19 Lipid Panel: Chol 207, , HDL 46,          EKG Interpretation:      Interpreted by Nilo Alexander MD  Time reviewed:3:32 PM   Symptoms at time of EKG: None   Rhythm: Sinus bradycardia  Rate: 47bpm  Axis: Normal  Ectopy: None  Conduction: Normal  ST Segments/ T Waves: No ST-T wave changes and No acute ischemic changes  Q Waves: None  Comparison to prior: No old EKG available    Clinical Impression: sinus bradycardia, otherwise normal ECG    ASSESSMENT AND PLAN:     (R07.9) Chest pain, unspecified type  (primary encounter diagnosis)  Plan: EKG 12-lead complete w/read - Clinics, Exercise        Stress Test - Adult    Differential includes chest wall pain, esophageal spasm/esophagitis, coronary disease, pericarditis, pneumothorax, pneumonia, PE, aortic dissection, etc.    Unstable angina possible but has been active today without recurrence of symptoms and ECG looks normal today. I have reviewed Faisal old records (labs and cardiology notes) from [place].  He is at intermediate risk for ischemic heart disease based on his age, sex, and presence of risk factors (rheumatoid " arthritis, lipids).     His pain certainly sounds like it had a pleuritic component which could be consistent with pericarditis or pleuritis but his symptoms resolved very quickly without NSAIDs which seems unlikely with either of these conditions.    Low suspicion for pneumothorax given bilateral breath sounds on exam and absence of significant dyspnea.  Low suspicion for pneumonia given afebrile, normal lung exam, no significant dyspnea, normal SpO2.  Low suspicion for PE. A year too old to meet PERC criteria. Wells score 0 and I honestly would not consider a PE consistent with his history based on the quick resolution of symptoms.  Low suspicion for aortic dissection given well appearance, lack of radiation of back, description of pain quality, lack of risk factors.     Based on how well he has been feeling today, I do not think he needs to go to the ED for ACS rule out at this time. I am recommending that he undergo repeat exercise ECG this week to help exclude the possibility of ischemic heart disease. I advised he avoid exercising in the meantime, start taking a low-dose aspirin only until the stress test, and to call 911 if he has a recurrent episode of pain/pressure.    Nilo Alexander MD   [facility]  01/16/2020, 3:29 PM

## 2020-01-16 NOTE — NURSING NOTE
"49 year old  Chief Complaint   Patient presents with     Chest Pain     woke at 0100 this morning with intense chest pain in the center of chest, episodes lasted a few seconds, intermittent for 2 hour, alleviated when sat up       Blood pressure 118/74, pulse (!) 46, temperature 97.7  F (36.5  C), temperature source Oral, resp. rate 18, height 1.783 m (5' 10.2\"), weight 95.6 kg (210 lb 12 oz), SpO2 98 %. Body mass index is 30.07 kg/m .  There is no problem list on file for this patient.      Wt Readings from Last 2 Encounters:   01/16/20 95.6 kg (210 lb 12 oz)   10/09/19 95.3 kg (210 lb)     BP Readings from Last 3 Encounters:   01/16/20 118/74   10/09/19 111/74   07/29/19 111/75         Current Outpatient Medications   Medication     HUMIRA *CF* PEN 40 MG/0.4ML pen kit     No current facility-administered medications for this visit.        Social History     Tobacco Use     Smoking status: Never Smoker     Smokeless tobacco: Never Used   Substance Use Topics     Alcohol use: Yes     Alcohol/week: 5.0 standard drinks     Types: 5 Standard drinks or equivalent per week     Comment: 5 drinks a week     Drug use: No       Health Maintenance Due   Topic Date Due     COLONOSCOPY  12/12/1978     HIV SCREENING  12/12/1983     PREVENTIVE CARE VISIT  05/21/2019       No results found for: PAP      January 16, 2020 2:56 PM    "

## 2020-01-20 ENCOUNTER — HOSPITAL ENCOUNTER (OUTPATIENT)
Dept: CARDIOLOGY | Facility: CLINIC | Age: 50
Discharge: HOME | End: 2020-01-20
Attending: FAMILY MEDICINE | Admitting: FAMILY MEDICINE
Payer: COMMERCIAL

## 2020-01-20 DIAGNOSIS — R07.9 CHEST PAIN, UNSPECIFIED TYPE: ICD-10-CM

## 2020-01-20 PROCEDURE — 93016 CV STRESS TEST SUPVJ ONLY: CPT | Performed by: INTERNAL MEDICINE

## 2020-01-20 PROCEDURE — 93017 CV STRESS TEST TRACING ONLY: CPT

## 2020-01-20 PROCEDURE — 94618 PULMONARY STRESS TESTING: CPT | Mod: 26 | Performed by: INTERNAL MEDICINE

## 2020-03-11 ENCOUNTER — HEALTH MAINTENANCE LETTER (OUTPATIENT)
Age: 50
End: 2020-03-11

## 2021-01-03 ENCOUNTER — HEALTH MAINTENANCE LETTER (OUTPATIENT)
Age: 51
End: 2021-01-03

## 2021-04-25 ENCOUNTER — HEALTH MAINTENANCE LETTER (OUTPATIENT)
Age: 51
End: 2021-04-25

## 2021-06-29 NOTE — NURSING NOTE
"47 year old  Chief Complaint   Patient presents with     [facility]     Physical       Blood pressure 113/70, pulse (!) 49, temperature 98.1  F (36.7  C), temperature source Oral, height 5' 10.32\" (178.6 cm), weight 198 lb 4 oz (89.9 kg), SpO2 98 %. Body mass index is 28.19 kg/(m^2).  There is no problem list on file for this patient.      Wt Readings from Last 2 Encounters:   05/21/18 198 lb 4 oz (89.9 kg)     BP Readings from Last 3 Encounters:   05/21/18 113/70         No current outpatient prescriptions on file.     No current facility-administered medications for this visit.        Social History   Substance Use Topics     Smoking status: Never Smoker     Smokeless tobacco: Never Used     Alcohol use Yes      Comment: 5 drinks a week       Health Maintenance Due   Topic Date Due     HIV SCREEN (SYSTEM ASSIGNED)  12/12/1986     LIPID SCREEN Q5 YR MALE (SYSTEM ASSIGNED)  12/12/2003       No results found for: PAP      May 21, 2018 10:13 AM  " Azithromycin Pregnancy And Lactation Text: This medication is considered safe during pregnancy and is also secreted in breast milk.

## 2021-10-10 ENCOUNTER — HEALTH MAINTENANCE LETTER (OUTPATIENT)
Age: 51
End: 2021-10-10

## 2022-04-15 ENCOUNTER — MEDICAL CORRESPONDENCE (OUTPATIENT)
Dept: HEALTH INFORMATION MANAGEMENT | Facility: CLINIC | Age: 52
End: 2022-04-15
Payer: COMMERCIAL

## 2022-05-21 ENCOUNTER — HEALTH MAINTENANCE LETTER (OUTPATIENT)
Age: 52
End: 2022-05-21

## 2022-05-25 ENCOUNTER — LAB (OUTPATIENT)
Dept: LAB | Facility: CLINIC | Age: 52
End: 2022-05-25
Payer: COMMERCIAL

## 2022-05-25 DIAGNOSIS — F90.9 ATTENTION DEFICIT HYPERACTIVITY DISORDER: ICD-10-CM

## 2022-05-25 DIAGNOSIS — L65.9 BALDNESS: ICD-10-CM

## 2022-05-25 DIAGNOSIS — M25.50 PAIN IN JOINT, MULTIPLE SITES: Primary | ICD-10-CM

## 2022-05-25 DIAGNOSIS — M25.60 JOINT STIFFNESS: ICD-10-CM

## 2022-05-25 DIAGNOSIS — R53.83 FATIGUE: ICD-10-CM

## 2022-05-25 DIAGNOSIS — R39.81 FUNCTIONAL URINARY INCONTINENCE: ICD-10-CM

## 2022-05-25 DIAGNOSIS — R11.0 NAUSEA: ICD-10-CM

## 2022-05-25 DIAGNOSIS — R39.15 URGENCY OF URINATION: ICD-10-CM

## 2022-05-25 LAB
ALBUMIN SERPL-MCNC: 4.4 G/DL (ref 3.4–5)
ALP SERPL-CCNC: 59 U/L (ref 40–150)
ALT SERPL W P-5'-P-CCNC: 28 U/L (ref 0–70)
ANION GAP SERPL CALCULATED.3IONS-SCNC: 4 MMOL/L (ref 3–14)
AST SERPL W P-5'-P-CCNC: 26 U/L (ref 0–45)
BASOPHILS # BLD AUTO: 0 10E3/UL (ref 0–0.2)
BASOPHILS NFR BLD AUTO: 1 %
BILIRUB SERPL-MCNC: 0.5 MG/DL (ref 0.2–1.3)
BUN SERPL-MCNC: 15 MG/DL (ref 7–30)
CALCIUM SERPL-MCNC: 9.9 MG/DL (ref 8.5–10.1)
CHLORIDE BLD-SCNC: 107 MMOL/L (ref 94–109)
CO2 SERPL-SCNC: 30 MMOL/L (ref 20–32)
CREAT SERPL-MCNC: 0.95 MG/DL (ref 0.66–1.25)
CRP SERPL HS-MCNC: 0.6 MG/L
DEPRECATED CALCIDIOL+CALCIFEROL SERPL-MC: 31 UG/L (ref 20–75)
EOSINOPHIL # BLD AUTO: 0.3 10E3/UL (ref 0–0.7)
EOSINOPHIL NFR BLD AUTO: 5 %
ERYTHROCYTE [DISTWIDTH] IN BLOOD BY AUTOMATED COUNT: 12.9 % (ref 10–15)
GFR SERPL CREATININE-BSD FRML MDRD: >90 ML/MIN/1.73M2
GLUCOSE BLD-MCNC: 105 MG/DL (ref 70–99)
HCT VFR BLD AUTO: 46.6 % (ref 40–53)
HGB BLD-MCNC: 15.5 G/DL (ref 13.3–17.7)
IMM GRANULOCYTES # BLD: 0 10E3/UL
IMM GRANULOCYTES NFR BLD: 0 %
LYMPHOCYTES # BLD AUTO: 1.6 10E3/UL (ref 0.8–5.3)
LYMPHOCYTES NFR BLD AUTO: 32 %
MCH RBC QN AUTO: 30.8 PG (ref 26.5–33)
MCHC RBC AUTO-ENTMCNC: 33.3 G/DL (ref 31.5–36.5)
MCV RBC AUTO: 93 FL (ref 78–100)
MONOCYTES # BLD AUTO: 0.3 10E3/UL (ref 0–1.3)
MONOCYTES NFR BLD AUTO: 6 %
NEUTROPHILS # BLD AUTO: 2.9 10E3/UL (ref 1.6–8.3)
NEUTROPHILS NFR BLD AUTO: 56 %
NRBC # BLD AUTO: 0 10E3/UL
NRBC BLD AUTO-RTO: 0 /100
PLATELET # BLD AUTO: 218 10E3/UL (ref 150–450)
POTASSIUM BLD-SCNC: 4.7 MMOL/L (ref 3.4–5.3)
PROT SERPL-MCNC: 7.7 G/DL (ref 6.8–8.8)
RBC # BLD AUTO: 5.04 10E6/UL (ref 4.4–5.9)
RHEUMATOID FACT SER NEPH-ACNC: <6 IU/ML
SODIUM SERPL-SCNC: 141 MMOL/L (ref 133–144)
T3FREE SERPL-MCNC: 3.5 PG/ML (ref 2.3–4.2)
T4 FREE SERPL-MCNC: 0.93 NG/DL (ref 0.76–1.46)
TSH SERPL DL<=0.005 MIU/L-ACNC: 1.5 MU/L (ref 0.4–4)
WBC # BLD AUTO: 5.1 10E3/UL (ref 4–11)

## 2022-05-25 PROCEDURE — 36415 COLL VENOUS BLD VENIPUNCTURE: CPT | Performed by: PATHOLOGY

## 2022-05-25 PROCEDURE — 80050 GENERAL HEALTH PANEL: CPT | Performed by: PATHOLOGY

## 2022-05-25 PROCEDURE — 82306 VITAMIN D 25 HYDROXY: CPT | Mod: 90 | Performed by: PATHOLOGY

## 2022-05-25 PROCEDURE — 83090 ASSAY OF HOMOCYSTEINE: CPT | Mod: 90 | Performed by: PATHOLOGY

## 2022-05-25 PROCEDURE — 84630 ASSAY OF ZINC: CPT | Mod: 90 | Performed by: PATHOLOGY

## 2022-05-25 PROCEDURE — 82525 ASSAY OF COPPER: CPT | Mod: 90 | Performed by: PATHOLOGY

## 2022-05-25 PROCEDURE — 99000 SPECIMEN HANDLING OFFICE-LAB: CPT | Performed by: PATHOLOGY

## 2022-05-25 PROCEDURE — 84439 ASSAY OF FREE THYROXINE: CPT | Performed by: PATHOLOGY

## 2022-05-25 PROCEDURE — 84481 FREE ASSAY (FT-3): CPT | Mod: 90 | Performed by: PATHOLOGY

## 2022-05-25 PROCEDURE — 86431 RHEUMATOID FACTOR QUANT: CPT | Mod: 90 | Performed by: PATHOLOGY

## 2022-05-25 PROCEDURE — 86141 C-REACTIVE PROTEIN HS: CPT | Mod: 90 | Performed by: PATHOLOGY

## 2022-05-27 LAB
COPPER SERPL-MCNC: 89.9 UG/DL
DEPRECATED CALCIDIOL+CALCIFEROL SERPL-MC: <34 UG/L (ref 20–75)
VITAMIN D2 SERPL-MCNC: <5 UG/L
VITAMIN D3 SERPL-MCNC: 29 UG/L
ZINC SERPL-MCNC: 100.1 UG/DL

## 2022-06-01 LAB — HCYS SERPL-SCNC: 10.9 UMOL/L (ref 4–12)

## 2022-09-18 ENCOUNTER — HEALTH MAINTENANCE LETTER (OUTPATIENT)
Age: 52
End: 2022-09-18

## 2022-11-30 ENCOUNTER — OFFICE VISIT (OUTPATIENT)
Dept: FAMILY MEDICINE | Facility: CLINIC | Age: 52
End: 2022-11-30
Payer: COMMERCIAL

## 2022-11-30 VITALS
SYSTOLIC BLOOD PRESSURE: 118 MMHG | TEMPERATURE: 97.5 F | DIASTOLIC BLOOD PRESSURE: 79 MMHG | RESPIRATION RATE: 15 BRPM | OXYGEN SATURATION: 97 % | BODY MASS INDEX: 27.68 KG/M2 | HEART RATE: 51 BPM | WEIGHT: 194 LBS

## 2022-11-30 DIAGNOSIS — Z01.818 PREOP GENERAL PHYSICAL EXAM: ICD-10-CM

## 2022-11-30 DIAGNOSIS — M25.561 RIGHT KNEE PAIN, UNSPECIFIED CHRONICITY: Primary | ICD-10-CM

## 2022-11-30 RX ORDER — LISDEXAMFETAMINE DIMESYLATE 10 MG/1
CAPSULE ORAL
COMMUNITY

## 2022-11-30 NOTE — NURSING NOTE
51 year old  Chief Complaint   Patient presents with     Pre-Op Exam     Meniscus repair , [facility], 12/6       Blood pressure 118/79, pulse 51, temperature 97.5  F (36.4  C), temperature source Skin, resp. rate 15, weight 88 kg (194 lb), SpO2 97 %. Body mass index is 27.68 kg/m .  Patient Active Problem List   Diagnosis     Rheumatoid arthritis (H)     Impaired fasting glucose     Elevated low density lipoprotein (LDL) cholesterol level       Wt Readings from Last 2 Encounters:   11/30/22 88 kg (194 lb)   01/16/20 95.6 kg (210 lb 12 oz)     BP Readings from Last 3 Encounters:   11/30/22 118/79   01/16/20 118/74   10/09/19 111/74         Current Outpatient Medications   Medication     HUMIRA *CF* PEN 40 MG/0.4ML pen kit     No current facility-administered medications for this visit.       Social History     Tobacco Use     Smoking status: Never     Smokeless tobacco: Never   Substance Use Topics     Alcohol use: Yes     Alcohol/week: 5.0 standard drinks     Types: 5 Standard drinks or equivalent per week     Comment: 5 drinks a week     Drug use: No       Health Maintenance Due   Topic Date Due     ADVANCE CARE PLANNING  Never done     HEPATITIS B IMMUNIZATION (1 of 3 - 3-dose series) Never done     HIV SCREENING  Never done     HEPATITIS C SCREENING  Never done     YEARLY PREVENTIVE VISIT  05/21/2019     Pneumococcal Vaccine: Pediatrics (0 to 5 Years) and At-Risk Patients (6 to 64 Years) (2 - PPSV23 if available, else PCV20) 07/29/2020     DTAP/TDAP/TD IMMUNIZATION (2 - Td or Tdap) 06/05/2022     COVID-19 Vaccine (4 - Booster for Lg series) 07/14/2022     INFLUENZA VACCINE (1) 09/01/2022       No results found for: PAP      November 30, 2022 9:30 AM

## 2022-11-30 NOTE — PROGRESS NOTES
[address], [address]  [facility] [address]  Phone: [phone]  Fax: [phone]  Primary Provider: Jameson Avila  Pre-op Performing Provider: JAMESON AVILA   :[id number]}  PREOPERATIVE EVALUATION:  Today's date: 11/30/2022    Faisal Estes is a 51 year old male who presents for a preoperative evaluation.    Surgical Information:  Surgery/Procedure: meniscus repair (right)  Surgery Location: [facility] [place]   Surgeon: Rob Sampson MD   Surgery Date: 12/6/22  Time of Surgery: TBD   Where patient plans to recover: At home with family  Fax number for surgical facility: [phone]    Type of Anesthesia Anticipated: General    Assessment & Plan     The proposed surgical procedure is considered INTERMEDIATE risk.    Problem List Items Addressed This Visit    None  Visit Diagnoses     Right knee pain, unspecified chronicity    -  Primary    Preop general physical exam              Risks and Recommendations:  The patient has the following additional risks and recommendations for perioperative complications:   - No identified additional risk factors other than previously addressed    Medication Instructions:  Patient is to take all scheduled medications on the day of surgery    RECOMMENDATION:  APPROVAL GIVEN to proceed with proposed procedure, without further diagnostic evaluation.    22 minutes spent on the date of the encounter doing chart review, history and exam, documentation and further activities as noted.      Subjective     HPI related to upcoming procedure: Worsening RIGHT knee pain limiting patient's ability to continue with activity. MRI indicates meniscal tear. Plan for surgical repair as noted above.       Preop Questions 11/30/2022   1. Have you ever had a heart attack or stroke? No   2. Have you ever had surgery on your heart or blood vessels, such as a stent placement, a coronary artery bypass, or surgery on an artery in your head, neck,  heart, or legs? No   3. Do you have chest pain with activity? No   4. Do you have a history of  heart failure? No   5. Do you currently have a cold, bronchitis or symptoms of other infection? No   6. Do you have a cough, shortness of breath, or wheezing? No   7. Do you or anyone in your family have previous history of blood clots? No   8. Do you or does anyone in your family have a serious bleeding problem such as prolonged bleeding following surgeries or cuts? No   9. Have you ever had problems with anemia or been told to take iron pills? No   10. Have you had any abnormal blood loss such as black, tarry or bloody stools? No   11. Have you ever had a blood transfusion? No   12. Are you willing to have a blood transfusion if it is medically needed before, during, or after your surgery? Yes   13. Have you or any of your relatives ever had problems with anesthesia? No   14. Do you have sleep apnea, excessive snoring or daytime drowsiness? No   15. Do you have any artifical heart valves or other implanted medical devices like a pacemaker, defibrillator, or continuous glucose monitor? No   16. Do you have artificial joints? YES - LEFT knee   17. Are you allergic to latex? No     Health Care Directive:  Patient does not have a Health Care Directive or Living Will: Patient states has Advance Directive and will bring in a copy to clinic.    Preoperative Review of :   reviewed - controlled substances prescribed by other outside provider(s).    Status of Chronic Conditions:  See problem list for active medical problems.  Problems all longstanding and stable, except as noted/documented.  See ROS for pertinent symptoms related to these conditions.      Review of Systems  CONSTITUTIONAL: NEGATIVE for fever, chills, change in weight  INTEGUMENTARY/SKIN: NEGATIVE for worrisome rashes, moles or lesions  EYES: NEGATIVE for vision changes or irritation  ENT/MOUTH: NEGATIVE for ear, mouth and throat problems  RESP: NEGATIVE for  significant cough or SOB  CV: NEGATIVE for chest pain, palpitations or peripheral edema  GI: NEGATIVE for nausea, abdominal pain, heartburn, or change in bowel habits  : NEGATIVE for frequency, dysuria, or hematuria  MUSCULOSKELETAL: RIGHT knee pain as noted above  NEURO: NEGATIVE for weakness, dizziness or paresthesias  ENDOCRINE: NEGATIVE for temperature intolerance, skin/hair changes  HEME: NEGATIVE for bleeding problems  PSYCHIATRIC: NEGATIVE for changes in mood or affect    Patient Active Problem List    Diagnosis Date Noted     Rheumatoid arthritis (H) 01/16/2020     Priority: Medium     Impaired fasting glucose 03/18/2019     Priority: Medium     Elevated low density lipoprotein (LDL) cholesterol level 03/18/2019     Priority: Medium      No past medical history on file.  Past Surgical History:   Procedure Laterality Date     ORTHOPEDIC SURGERY Left 2003    Medial knee replacement due to rugby injury      right wrist ganglion      30 years ago     TONSILLECTOMY       VASECTOMY       Current Outpatient Medications   Medication Sig Dispense Refill     HUMIRA *CF* PEN 40 MG/0.4ML pen kit Inject 40 mg as directed every 14 days  1       No Known Allergies     Social History     Tobacco Use     Smoking status: Never     Smokeless tobacco: Never   Substance Use Topics     Alcohol use: Yes     Alcohol/week: 5.0 standard drinks     Types: 5 Standard drinks or equivalent per week     Comment: 5 drinks a week       History   Drug Use No         Objective     /79 (BP Location: Right arm, Patient Position: Sitting, Cuff Size: Adult Large)   Pulse 51   Temp 97.5  F (36.4  C) (Skin)   Resp 15   Wt 88 kg (194 lb)   SpO2 97%   BMI 27.68 kg/m      Physical Exam    GENERAL APPEARANCE: healthy, alert and no distress     EYES: EOMI,  PERRL     HENT: ear canals and TM's normal and nose and mouth without ulcers or lesions     NECK: no adenopathy, no asymmetry, masses, or scars and thyroid normal to palpation     RESP:  lungs clear to auscultation - no rales, rhonchi or wheezes     CV: regular rates and rhythm, normal S1 S2, no S3 or S4 and no murmur, click or rub     ABDOMEN:  soft, nontender, no HSM or masses and bowel sounds normal     MS: extremities normal- no gross deformities noted, no evidence of inflammation in joints, FROM in all extremities.     SKIN: no suspicious lesions or rashes     NEURO: Normal strength and tone, sensory exam grossly normal, mentation intact and speech normal     PSYCH: mentation appears normal. and affect normal/bright     LYMPHATICS: No cervical adenopathy    Recent Labs   Lab Test 05/25/22  1003   HGB 15.5         POTASSIUM 4.7   CR 0.95        Diagnostics:  No labs were ordered during this visit.   No EKG required, no history of coronary heart disease, significant arrhythmia, peripheral arterial disease or other structural heart disease.    Revised Cardiac Risk Index (RCRI):  The patient has the following serious cardiovascular risks for perioperative complications:   - No serious cardiac risks = 0 points     RCRI Interpretation: 0 points: Class I (very low risk - 0.4% complication rate)           Signed Electronically by: Alexis Avila MD  Copy of this evaluation report is provided to requesting physician.

## 2023-06-04 ENCOUNTER — HEALTH MAINTENANCE LETTER (OUTPATIENT)
Age: 53
End: 2023-06-04

## 2024-07-14 ENCOUNTER — HEALTH MAINTENANCE LETTER (OUTPATIENT)
Age: 54
End: 2024-07-14

## 2025-01-06 ENCOUNTER — OFFICE VISIT (OUTPATIENT)
Dept: FAMILY MEDICINE | Facility: CLINIC | Age: 55
End: 2025-01-06
Payer: COMMERCIAL

## 2025-01-06 VITALS
RESPIRATION RATE: 14 BRPM | BODY MASS INDEX: 30.49 KG/M2 | HEART RATE: 49 BPM | TEMPERATURE: 97.4 F | HEIGHT: 70 IN | SYSTOLIC BLOOD PRESSURE: 120 MMHG | DIASTOLIC BLOOD PRESSURE: 71 MMHG | OXYGEN SATURATION: 95 % | WEIGHT: 213 LBS

## 2025-01-06 DIAGNOSIS — F90.9 ATTENTION DEFICIT HYPERACTIVITY DISORDER (ADHD), UNSPECIFIED ADHD TYPE: ICD-10-CM

## 2025-01-06 DIAGNOSIS — M06.9 RHEUMATOID ARTHRITIS INVOLVING MULTIPLE SITES, UNSPECIFIED WHETHER RHEUMATOID FACTOR PRESENT (H): Primary | ICD-10-CM

## 2025-01-06 RX ORDER — LISDEXAMFETAMINE DIMESYLATE 20 MG/1
20 CAPSULE ORAL DAILY
COMMUNITY
Start: 2024-10-29

## 2025-01-06 ASSESSMENT — ANXIETY QUESTIONNAIRES
1. FEELING NERVOUS, ANXIOUS, OR ON EDGE: NOT AT ALL
6. BECOMING EASILY ANNOYED OR IRRITABLE: NOT AT ALL
5. BEING SO RESTLESS THAT IT IS HARD TO SIT STILL: NOT AT ALL
2. NOT BEING ABLE TO STOP OR CONTROL WORRYING: NOT AT ALL
7. FEELING AFRAID AS IF SOMETHING AWFUL MIGHT HAPPEN: NOT AT ALL
IF YOU CHECKED OFF ANY PROBLEMS ON THIS QUESTIONNAIRE, HOW DIFFICULT HAVE THESE PROBLEMS MADE IT FOR YOU TO DO YOUR WORK, TAKE CARE OF THINGS AT HOME, OR GET ALONG WITH OTHER PEOPLE: NOT DIFFICULT AT ALL
3. WORRYING TOO MUCH ABOUT DIFFERENT THINGS: NOT AT ALL
GAD7 TOTAL SCORE: 0

## 2025-01-06 ASSESSMENT — PATIENT HEALTH QUESTIONNAIRE - PHQ9: 5. POOR APPETITE OR OVEREATING: NOT AT ALL

## 2025-01-06 NOTE — LETTER
[facility]  01/06/25  Patient: Faisal Estes  YOB: 1970  Medical Record Number: [id number]                                                                                  Non-Opioid Controlled Substance Agreement    This is an agreement between you and your provider regarding safe and appropriate use of controlled substances prescribed by your care team. Controlled substances are?medicines that can cause physical and mental dependence (abuse).     There are strict laws about having and using these medicines. We here at [facility] are  committed to working with you in your efforts to get better. To support you in this work, we'll help you schedule regular office appointments for medicine refills. If we must cancel or change your appointment for any reason, we'll make sure you have enough medicine to last until your next appointment.     As a Provider, I will:   Listen carefully to your concerns while treating you with respect.   Recommend a treatment plan that I believe is in your best interest and may involve therapies other than medicine.    Talk with you often about the possible benefits and the risk of harm of any medicine that we prescribe for you.  Assess the safety of this medicine and check how well it works.    Provide a plan on how to taper (discontinue or go off) using this medicine if the decision is made to stop its use.      ::  As a Patient, I understand controlled substances:     Are prescribed by my care provider to help me function or work and manage my condition(s).?  Are strong medicines and can cause serious side effects.     Need to be taken exactly as prescribed.?Combining controlled substances with certain medicines or chemicals (such as illegal drugs, alcohol, sedatives, sleeping pills, and benzodiazepines) can be dangerous or even fatal.? If I stop taking my medicines suddenly, I may have severe withdrawal symptoms.     The risks, benefits, and side effects  of these medicine(s) were explained to me. I agree that:    I will take part in other treatments as advised by my care team. This may be psychiatry or counseling, physical therapy, behavioral therapy, group treatment or a referral to specialist.    I will keep all my appointments and understand this is part of the monitoring of controlled substances.?My care team may require an office visit for EVERY controlled substance refill. If I miss appointments or don t follow instructions, my care team may stop my medicine    I will take my medicines as prescribed. I will not change the dose or schedule unless my care team tells me to. There will be no refills if I run out early.      I may be asked to come to the clinic and complete a urine drug test or complete a pill count. If I don t give a urine sample or participate in a pill count, the care team may stop my medicine.    I will only receive controlled substance prescriptions from this clinic. If I am treated by another provider, I will tell them that I am taking controlled substances and that I have a treatment agreement with this provider. I will inform my [facility] care team within one business day if I am given a prescription for any controlled substance by another healthcare provider. My [facility] care team can contact other providers and pharmacists about my use of any medicines.    It is up to me to make sure that I don't run out of my medicines on weekends or holidays.?If my care team is willing to refill my prescription without a visit, I must request refills only during office hours. Refills may take up to 3 business days to process. I will use one pharmacy to fill all my controlled substance prescriptions. I will notify the clinic about any changes to my insurance or medicine availability.    I am responsible for my prescriptions. If the medicine/prescription is lost, stolen or destroyed, it will not be replaced.?I also agree not to share  controlled substance medicines with anyone.     I am aware I should not use any illegal or recreational drugs. I agree not to drink alcohol unless my care team says I can.     If I enroll in the [place] Medical Cannabis program, I will tell my care team before my next refill.    I will tell my care team right away if I become pregnant, have a new medical problem treated outside of my regular clinic, or have a change in my medicines.     I understand that this medicine can affect my thinking, judgment and reaction time.? Alcohol and drugs affect the brain and body, which can affect the safety of my driving. Being under the influence of alcohol or drugs can affect my decision-making, behaviors, personal safety and the safety of others. Driving while impaired (DWI) can occur if a person is driving, operating or in physical control of a car, motorcycle, boat, snowmobile, ATV, motorbike, off-road vehicle or any other motor vehicle ([place] Statute 169A.20). I understand the risk if I choose to drive or operate any vehicle or machinery.    I understand that if I do not follow any of the conditions above, my prescriptions or treatment may be stopped or changed.   I agree that my provider, clinic care team and pharmacy may work with any city, state or federal law enforcement agency that investigates the misuse, sale or other diversion of my controlled medicine. I will allow my provider to discuss my care with, or share a copy of, this agreement with any other treating provider, pharmacy or emergency room where I receive care.     I have read this agreement and have asked questions about anything I did not understand.    ________________________________________________________  Patient Signature - Faisal Estes     ___________________                   Date     ________________________________________________________  Provider Signature - Alexis Avila MD       ___________________                   Date      ________________________________________________________  Witness Signature (required if provider not present while patient signing)          ___________________                   Date

## 2025-01-06 NOTE — PROGRESS NOTES
"  Assessment & Plan   Problem List Items Addressed This Visit          Immune    Rheumatoid arthritis (H) - Primary    Relevant Orders    Adult Rheumatology  Referral     Other Visit Diagnoses       Attention deficit hyperactivity disorder (ADHD), unspecified ADHD type        Relevant Medications    VYVANSE 20 MG capsule           I agreed to take over management of Marya's ADHD medication. CSA signed today.    Referral to rheumatology as noted above.     I advised Marya that I am open to reviewing the results from this  service he is working with, but that I may not prescribe medications based solely upon their lab results or their recommendations. We can discuss how to proceed based upon their labs and recommendations on a case by case basis.     The longitudinal plan of care for the diagnosis(es)/condition(s) as documented were addressed during this visit. Due to the added complexity in care, I will continue to support MARYA in the subsequent management and with ongoing continuity of care.    45 minutes spent on the date of the encounter doing chart review, history and exam, documentation and further activities as noted.    Alexis Avila MD  5:28 PM, January 6, 2025        Subjective   MARYA is a 54 year old, presenting for the following health issues:  Follow Up (New discussion of hormone therapy. /ADHD follow-up)    HPI   Wide ranging discussion about questions Marya has for me in my role as his PCP  ADHD  - has been taking Vyvanse 20mg for this, \"not every day\" but regularly  - wondering if I would be able take over managing this medication    \"Hormone management\"  - he is working with some sort of \"\" medicine group where he has labs drawn every few months or so and depending on the lab results they advise him what sorts of supplements he needs to be taking and at what doses  - among these labs, they test his testosterone levels  - Marya is wondering if I would be able to " "manage the prescriptions base upon these lab results    Rheumatology  - previously diagnosed with RA  - currently managed with Humira  - Faisal reports he currently takes his Humira every 4 weeks instead of 2 weeks   - he would like to eventually come completely off this medication but he feels his current rheumatologist is not interested in helping him do this  - he would like a referral to a different rheumatology group/provider        Review of Systems  Constitutional, HEENT, cardiovascular, pulmonary, gi and gu systems are negative, except as otherwise noted.      Objective    /71 (BP Location: Left arm, Patient Position: Sitting, Cuff Size: Adult Large)   Pulse (!) 49   Temp 97.4  F (36.3  C) (Skin)   Resp 14   Ht 1.783 m (5' 10.2\")   Wt 96.6 kg (213 lb)   SpO2 95%   BMI 30.39 kg/m    Body mass index is 30.39 kg/m .    Physical Exam   GENERAL: alert and no distress  NECK: no adenopathy, no asymmetry, masses, or scars  RESP: lungs clear to auscultation - no rales, rhonchi or wheezes  CV: regular rate and rhythm, normal S1 S2, no S3 or S4, no murmur, click or rub, no peripheral edema  MS: no gross musculoskeletal defects noted, no edema          Signed Electronically by: Alexis Avila MD    "

## 2025-01-06 NOTE — NURSING NOTE
"54 year old  Chief Complaint   Patient presents with    Follow Up     New discussion of hormone therapy.   ADHD follow-up       Blood pressure 120/71, pulse (!) 49, temperature 97.4  F (36.3  C), temperature source Skin, resp. rate 14, height 1.783 m (5' 10.2\"), weight 96.6 kg (213 lb), SpO2 95%. Body mass index is 30.39 kg/m .  Patient Active Problem List   Diagnosis    Rheumatoid arthritis (H)    Impaired fasting glucose    Elevated low density lipoprotein (LDL) cholesterol level       Wt Readings from Last 2 Encounters:   01/06/25 96.6 kg (213 lb)   11/30/22 88 kg (194 lb)     BP Readings from Last 3 Encounters:   01/06/25 120/71   11/30/22 118/79   01/16/20 118/74         Current Outpatient Medications   Medication Sig Dispense Refill    HUMIRA *CF* PEN 40 MG/0.4ML pen kit Inject 40 mg as directed every 14 days  1    VYVANSE 20 MG capsule Take 20 mg by mouth daily.      lisdexamfetamine (VYVANSE) 10 MG capsule Vyvanse 10 mg capsule (Patient not taking: Reported on 1/6/2025)       No current facility-administered medications for this visit.       Social History     Tobacco Use    Smoking status: Never    Smokeless tobacco: Never   Substance Use Topics    Alcohol use: Yes     Alcohol/week: 5.0 standard drinks of alcohol     Types: 5 Standard drinks or equivalent per week     Comment: 5 drinks a week    Drug use: No       Health Maintenance Due   Topic Date Due    HIV SCREENING  Never done    HEPATITIS C SCREENING  Never done    HEPATITIS B IMMUNIZATION (1 of 3 - 19+ 3-dose series) Never done    DTAP/TDAP/TD IMMUNIZATION (1 - Tdap) 06/06/2012    YEARLY PREVENTIVE VISIT  05/21/2019    LIPID  05/21/2019    Pneumococcal Vaccine: 50+ Years (2 of 2 - PPSV23) 09/23/2019    INFLUENZA VACCINE (1) 09/01/2024    COVID-19 Vaccine (4 - 2024-25 season) 09/01/2024       No results found for: \"PAP\"      January 6, 2025 4:35 PM   "

## 2025-01-08 ASSESSMENT — ANXIETY QUESTIONNAIRES: GAD7 TOTAL SCORE: 0

## 2025-01-08 ASSESSMENT — PATIENT HEALTH QUESTIONNAIRE - PHQ9: SUM OF ALL RESPONSES TO PHQ QUESTIONS 1-9: 2

## 2025-01-28 ENCOUNTER — OFFICE VISIT (OUTPATIENT)
Dept: RHEUMATOLOGY | Facility: CLINIC | Age: 55
End: 2025-01-28
Attending: INTERNAL MEDICINE
Payer: COMMERCIAL

## 2025-01-28 ENCOUNTER — LAB (OUTPATIENT)
Dept: LAB | Facility: CLINIC | Age: 55
End: 2025-01-28
Payer: COMMERCIAL

## 2025-01-28 VITALS
OXYGEN SATURATION: 98 % | BODY MASS INDEX: 29.82 KG/M2 | WEIGHT: 209 LBS | SYSTOLIC BLOOD PRESSURE: 124 MMHG | HEART RATE: 54 BPM | DIASTOLIC BLOOD PRESSURE: 84 MMHG

## 2025-01-28 DIAGNOSIS — M25.50 POLYARTHRALGIA: Primary | ICD-10-CM

## 2025-01-28 DIAGNOSIS — Z79.899 LONG-TERM USE OF HIGH-RISK MEDICATION: ICD-10-CM

## 2025-01-28 DIAGNOSIS — Z87.39 HISTORY OF RHEUMATOID ARTHRITIS: ICD-10-CM

## 2025-01-28 LAB
ALT SERPL W P-5'-P-CCNC: 35 U/L (ref 0–70)
AST SERPL W P-5'-P-CCNC: 34 U/L (ref 0–45)
BASOPHILS # BLD AUTO: 0 10E3/UL (ref 0–0.2)
BASOPHILS NFR BLD AUTO: 0 %
CREAT SERPL-MCNC: 0.99 MG/DL (ref 0.67–1.17)
CRP SERPL-MCNC: <3 MG/L
EGFRCR SERPLBLD CKD-EPI 2021: 89 ML/MIN/1.73M2
EOSINOPHIL # BLD AUTO: 0.2 10E3/UL (ref 0–0.7)
EOSINOPHIL NFR BLD AUTO: 3 %
ERYTHROCYTE [DISTWIDTH] IN BLOOD BY AUTOMATED COUNT: 12.7 % (ref 10–15)
HCT VFR BLD AUTO: 47.2 % (ref 40–53)
HGB BLD-MCNC: 16.1 G/DL (ref 13.3–17.7)
IMM GRANULOCYTES # BLD: 0 10E3/UL
IMM GRANULOCYTES NFR BLD: 0 %
LYMPHOCYTES # BLD AUTO: 1.9 10E3/UL (ref 0.8–5.3)
LYMPHOCYTES NFR BLD AUTO: 33 %
MCH RBC QN AUTO: 30.7 PG (ref 26.5–33)
MCHC RBC AUTO-ENTMCNC: 34.1 G/DL (ref 31.5–36.5)
MCV RBC AUTO: 90 FL (ref 78–100)
MONOCYTES # BLD AUTO: 0.4 10E3/UL (ref 0–1.3)
MONOCYTES NFR BLD AUTO: 7 %
NEUTROPHILS # BLD AUTO: 3.2 10E3/UL (ref 1.6–8.3)
NEUTROPHILS NFR BLD AUTO: 57 %
PLATELET # BLD AUTO: 232 10E3/UL (ref 150–450)
RBC # BLD AUTO: 5.24 10E6/UL (ref 4.4–5.9)
WBC # BLD AUTO: 5.7 10E3/UL (ref 4–11)

## 2025-01-28 PROCEDURE — 84460 ALANINE AMINO (ALT) (SGPT): CPT

## 2025-01-28 PROCEDURE — 99204 OFFICE O/P NEW MOD 45 MIN: CPT | Performed by: INTERNAL MEDICINE

## 2025-01-28 PROCEDURE — 86481 TB AG RESPONSE T-CELL SUSP: CPT

## 2025-01-28 PROCEDURE — G2211 COMPLEX E/M VISIT ADD ON: HCPCS | Performed by: INTERNAL MEDICINE

## 2025-01-28 PROCEDURE — 86140 C-REACTIVE PROTEIN: CPT

## 2025-01-28 PROCEDURE — 85025 COMPLETE CBC W/AUTO DIFF WBC: CPT

## 2025-01-28 PROCEDURE — 36415 COLL VENOUS BLD VENIPUNCTURE: CPT

## 2025-01-28 PROCEDURE — 99213 OFFICE O/P EST LOW 20 MIN: CPT | Performed by: INTERNAL MEDICINE

## 2025-01-28 PROCEDURE — 82565 ASSAY OF CREATININE: CPT

## 2025-01-28 PROCEDURE — 84450 TRANSFERASE (AST) (SGOT): CPT

## 2025-01-28 RX ORDER — LISDEXAMFETAMINE DIMESYLATE 20 MG/1
20 CAPSULE ORAL EVERY MORNING
COMMUNITY

## 2025-01-28 ASSESSMENT — PAIN SCALES - GENERAL: PAINLEVEL_OUTOF10: NO PAIN (0)

## 2025-01-28 NOTE — NURSING NOTE
Chief Complaint   Patient presents with    Consult     /84 (BP Location: Right arm, Patient Position: Sitting, Cuff Size: Adult Large)   Pulse 54   Wt 94.8 kg (209 lb)   SpO2 98%   BMI 29.82 kg/m

## 2025-01-28 NOTE — LETTER
1/28/2025       RE: Faisal Estes  [address] [place] [address]     Dear Colleague,    Thank you for referring your patient, Faisal Estes, to the [facility] RHEUMATOLOGY at [facility]. Please see a copy of my visit note below.                           Chief Complaint/Reason for Visit: Polyarthralgia     HPI:    Faisal Estes is a 54 year old White male with past medical history listed below.  In 2019, he was playing a lot of tennis, he felt pain in his right shoulder. He was evaluated by a orthopaedician - received steroid injection. Started having pain in his left shoulder as well. He was then seen by a rheumatologist (Dr.Asim Demarco) - by then he was having bilateral hip and knee pain. There was no associated swelling. He was diagnosed with RA. He was treated with prednisone - some relief but did not really help enough. His pain also involved wrists later. He was later started on methotrexate and later transitioned to Humira. Methotrexate did not workl. He did very well on Humira (since 2019), he had instant relief. He has since been doing well without pain of joints. Denied having low back pain. He is on Humira 40 mg subcutaneous takes every 4-5 weeks, been on this dose for the past three years.     REVIEW OF SYSTEMS    General - neg for fatigue   HEENT -negative: Oral or nasal ulcerations  Cardiovascular -negative for chest pain  Respiratory -negative for difficulty breathing or cough  Gastrointestinal -negative for bloody stools  Genitourinary -negative for blood in urine  Skin -negative for skin rash  Neuro -negative for numbness   Hematologic -negative for blood clots or history of malignancy  Psych -no history of anxiety or depression       No past medical history on file.  Past Surgical History:   Procedure Laterality Date     ORTHOPEDIC SURGERY Left 2003    Medial knee replacement due to rugby injury      right wrist  ganglion      30 years ago     TONSILLECTOMY       VASECTOMY       Family History   Problem Relation Age of Onset     Cancer Maternal Grandfather 70        stomach     Coronary Artery Disease Paternal Grandfather 70     Melanoma No family hx of      Skin Cancer No family hx of      Social History     Socioeconomic History     Marital status:      Spouse name: None     Number of children: 3     Years of education: None     Highest education level: None   Occupational History     Occupation:      Comment: Own [employer]   Tobacco Use     Smoking status: Never     Smokeless tobacco: Never   Vaping Use     Vaping status: Never Used   Substance and Sexual Activity     Alcohol use: Yes     Alcohol/week: 5.0 standard drinks of alcohol     Types: 5 Standard drinks or equivalent per week     Comment: 5 drinks a week     Drug use: No     Sexual activity: Yes     Partners: Female     Birth control/protection: I.U.D.   Social History Narrative    Live with 3 children and wife. MOther in law was living with them but she recently .  One dog. Has his own marketing business.  Originally from [place].  Moved to  6 years ago.        Has a good support system.    Feels safe in all environments.    Wears seatbelt 100% of the time    Wears helmet while biking.    Dentist regular exams    Eye doctor regular visits. Wears glasses.    Mild snoring but no observed apnea.    Denies history of abuse, past or present, physical, sexual or emotional.    16    Naina Antunez PA-C     Social Drivers of Health     Interpersonal Safety: Low Risk  (2025)    Interpersonal Safety      Do you feel physically and emotionally safe where you currently live?: Yes      Within the past 12 months, have you been hit, slapped, kicked or otherwise physically hurt by someone?: No      Within the past 12 months, have you been humiliated or emotionally abused in other ways by your partner or ex-partner?: No        No Known Allergies    Current Outpatient Medications   Medication Sig Dispense Refill     HUMIRA *CF* PEN 40 MG/0.4ML pen kit Inject 40 mg as directed every 14 days  1     lisdexamfetamine (VYVANSE) 20 MG capsule Take 20 mg by mouth every morning.       VYVANSE 20 MG capsule Take 1 capsule (20 mg) by mouth daily. (Patient not taking: Reported on 1/28/2025) 30 capsule 0     No current facility-administered medications for this visit.       PHYSICAL EXAM    /84 (BP Location: Right arm, Patient Position: Sitting, Cuff Size: Adult Large)   Pulse 54   Wt 94.8 kg (209 lb)   SpO2 98%   BMI 29.82 kg/m        General: Alert, No apparent distress  Eyes: Sclera non injected  Skin: No rashes noted  Cardiovascular: Regular rate and rhythm, Normal S1 and S2 and No murmurs, rubs or gallops  Respiratory: Clear to auscultation with no wheezing or crackles  Neuro: Normal gait and Able to arise from seated position unassisted  Musculoskeletal: Hands:  Normal. No synovitis.   Wrists:  Normal.  Elbows:  Normal.  Shoulders:  Normal.  Feet:  Normal.  Ankles:  Normal.  Knees:  Normal.      LABS   Reviewed as below    2019  OSH labs  HIV neg   QTB neg   SANDOVAL neg   RF,CCP neg   MPO, PR3 neg     HBs Antigen, S Negative --   HBs Antibody, S -- Negative   HBs Antibody, Quantitative, S -- <5.0     HCV Ab, S Negative       ASSESSMENT      1. Polyarthralgia    2. History of rheumatoid arthritis    3. Long-term use of high-risk medication        (M25.50) Polyarthralgia  (primary encounter diagnosis)  (Z87.39) History of rheumatoid arthritis  Comment: RF, CCP negative.  Initial symptoms : multiple joint pain without swelling. Diagnosed with rheumatoid arthritis by  2019.Methotrexate did not work . He has been on Humira since then.  He has been spacing out Humira injections every 4-5 weeks.  That has been working very well for him. He denied having any joint pain or swelling.   Plan:   Continue Humira, recommend spacing out to every  8 weeks. Had the discussion with him that if he truly does have RA, his symptoms should return and we can resume Humira every 14 days. He prefers spacing out to know how he does off of Rx. This is reasonable given he was seronegative.   Check labs.  Follow up in 2 months.   Orders Placed This Encounter   Procedures     XR Hand Bilateral G/E 3 Views     X-ray bl Wrist G/E 3 vws     Creatinine     AST     ALT     CRP inflammation     CBC with platelets differential     Quantiferon TB Gold Plus       (Z79.899) Long-term use of high-risk medication  Comment: Humira  Plan: have discussed with potential adverse effects with use of biologics such as suppression of immune system making pt more prone to infections, to hold the medication if the patient has any signs of any infection and to restart only after infection has cleared, reactivation of infections particularly TB and hepatitis B, C, fungal infections, risk of demyelinating disorders and to stop medication immediately if there's any weakness or paresthesias, risk of malignancy. Patient verbalized understanding and agrees to proceed.    VIV RUCKER MD    Division of Rheumatic & Autoimmune Diseases  [facility]       Again, thank you for allowing me to participate in the care of your patient.      Sincerely,    Viv Rucker MD

## 2025-01-28 NOTE — PROGRESS NOTES
Chief Complaint/Reason for Visit: Polyarthralgia     HPI:    Faisal Estes is a 54 year old White male with past medical history listed below.  In 2019, he was playing a lot of tennis, he felt pain in his right shoulder. He was evaluated by a orthopaedician - received steroid injection. Started having pain in his left shoulder as well. He was then seen by a rheumatologist (Dr.Asim Demarco) - by then he was having bilateral hip and knee pain. There was no associated swelling. He was diagnosed with RA. He was treated with prednisone - some relief but did not really help enough. His pain also involved wrists later. He was later started on methotrexate and later transitioned to Humira. Methotrexate did not workl. He did very well on Humira (since 2019), he had instant relief. He has since been doing well without pain of joints. Denied having low back pain. He is on Humira 40 mg subcutaneous takes every 4-5 weeks, been on this dose for the past three years.     REVIEW OF SYSTEMS    General - neg for fatigue   HEENT -negative: Oral or nasal ulcerations  Cardiovascular -negative for chest pain  Respiratory -negative for difficulty breathing or cough  Gastrointestinal -negative for bloody stools  Genitourinary -negative for blood in urine  Skin -negative for skin rash  Neuro -negative for numbness   Hematologic -negative for blood clots or history of malignancy  Psych -no history of anxiety or depression       No past medical history on file.  Past Surgical History:   Procedure Laterality Date    ORTHOPEDIC SURGERY Left 2003    Medial knee replacement due to rugby injury     right wrist ganglion      30 years ago    TONSILLECTOMY      VASECTOMY       Family History   Problem Relation Age of Onset    Cancer Maternal Grandfather 70        stomach    Coronary Artery Disease Paternal Grandfather 70    Melanoma No family hx of     Skin Cancer No family hx of      Social History     Socioeconomic History     Marital status:      Spouse name: None    Number of children: 3    Years of education: None    Highest education level: None   Occupational History    Occupation:      Comment: Own [employer]   Tobacco Use    Smoking status: Never    Smokeless tobacco: Never   Vaping Use    Vaping status: Never Used   Substance and Sexual Activity    Alcohol use: Yes     Alcohol/week: 5.0 standard drinks of alcohol     Types: 5 Standard drinks or equivalent per week     Comment: 5 drinks a week    Drug use: No    Sexual activity: Yes     Partners: Female     Birth control/protection: I.U.D.   Social History Narrative    Live with 3 children and wife. MOther in law was living with them but she recently .  One dog. Has his own marketing business.  Originally from [facility].  Moved to  6 years ago.        Has a good support system.    Feels safe in all environments.    Wears seatbelt 100% of the time    Wears helmet while biking.    Dentist regular exams    Eye doctor regular visits. Wears glasses.    Mild snoring but no observed apnea.    Denies history of abuse, past or present, physical, sexual or emotional.    16    Naina Antunez PA-C     Social Drivers of Health     Interpersonal Safety: Low Risk  (2025)    Interpersonal Safety     Do you feel physically and emotionally safe where you currently live?: Yes     Within the past 12 months, have you been hit, slapped, kicked or otherwise physically hurt by someone?: No     Within the past 12 months, have you been humiliated or emotionally abused in other ways by your partner or ex-partner?: No       No Known Allergies    Current Outpatient Medications   Medication Sig Dispense Refill    HUMIRA *CF* PEN 40 MG/0.4ML pen kit Inject 40 mg as directed every 14 days  1    lisdexamfetamine (VYVANSE) 20 MG capsule Take 20 mg by mouth every morning.      VYVANSE 20 MG capsule Take 1 capsule (20 mg) by mouth daily. (Patient not taking:  Reported on 1/28/2025) 30 capsule 0     No current facility-administered medications for this visit.       PHYSICAL EXAM    /84 (BP Location: Right arm, Patient Position: Sitting, Cuff Size: Adult Large)   Pulse 54   Wt 94.8 kg (209 lb)   SpO2 98%   BMI 29.82 kg/m        General: Alert, No apparent distress  Eyes: Sclera non injected  Skin: No rashes noted  Cardiovascular: Regular rate and rhythm, Normal S1 and S2 and No murmurs, rubs or gallops  Respiratory: Clear to auscultation with no wheezing or crackles  Neuro: Normal gait and Able to arise from seated position unassisted  Musculoskeletal: Hands:  Normal. No synovitis.   Wrists:  Normal.  Elbows:  Normal.  Shoulders:  Normal.  Feet:  Normal.  Ankles:  Normal.  Knees:  Normal.      LABS   Reviewed as below    2019  OSH labs  HIV neg   QTB neg   SANDOVAL neg   RF,CCP neg   MPO, PR3 neg     HBs Antigen, S Negative --   HBs Antibody, S -- Negative   HBs Antibody, Quantitative, S -- <5.0     HCV Ab, S Negative       ASSESSMENT      1. Polyarthralgia    2. History of rheumatoid arthritis    3. Long-term use of high-risk medication        (M25.50) Polyarthralgia  (primary encounter diagnosis)  (Z87.39) History of rheumatoid arthritis  Comment: RF, CCP negative.  Initial symptoms : multiple joint pain without swelling. Diagnosed with rheumatoid arthritis by Dr. Ruelas.Methotrexate did not work . He has been on Humira since then.  He has been spacing out Humira injections every 4-5 weeks.  That has been working very well for him. He denied having any joint pain or swelling.   Plan:   Continue Humira, recommend spacing out to every 8 weeks. Had the discussion with him that if he truly does have RA, his symptoms should return and we can resume Humira every 14 days. He prefers spacing out to know how he does off of Rx. This is reasonable given he was seronegative.   Check labs.  Follow up in 2 months.   Orders Placed This Encounter   Procedures    XR Hand Bilateral G/E  3 Views    X-ray bl Wrist G/E 3 vws    Creatinine    AST    ALT    CRP inflammation    CBC with platelets differential    Quantiferon TB Gold Plus       (Z79.899) Long-term use of high-risk medication  Comment: Humira  Plan: have discussed with potential adverse effects with use of biologics such as suppression of immune system making pt more prone to infections, to hold the medication if the patient has any signs of any infection and to restart only after infection has cleared, reactivation of infections particularly TB and hepatitis B, C, fungal infections, risk of demyelinating disorders and to stop medication immediately if there's any weakness or paresthesias, risk of malignancy. Patient verbalized understanding and agrees to proceed.    OSVALDO RUCKER MD    Division of Rheumatic & Autoimmune Diseases  [facility]

## 2025-01-29 LAB
QUANTIFERON MITOGEN: 10 IU/ML
QUANTIFERON NIL TUBE: 0.02 IU/ML
QUANTIFERON TB1 TUBE: 0.03 IU/ML
QUANTIFERON TB2 TUBE: 0.04

## 2025-01-30 LAB
GAMMA INTERFERON BACKGROUND BLD IA-ACNC: 0.02 IU/ML
M TB IFN-G BLD-IMP: NEGATIVE
M TB IFN-G CD4+ BCKGRND COR BLD-ACNC: 9.98 IU/ML
MITOGEN IGNF BCKGRD COR BLD-ACNC: 0.01 IU/ML
MITOGEN IGNF BCKGRD COR BLD-ACNC: 0.02 IU/ML

## 2025-02-10 ENCOUNTER — MYC MEDICAL ADVICE (OUTPATIENT)
Dept: FAMILY MEDICINE | Facility: CLINIC | Age: 55
End: 2025-02-10

## 2025-02-10 DIAGNOSIS — F90.9 ATTENTION DEFICIT HYPERACTIVITY DISORDER (ADHD), UNSPECIFIED ADHD TYPE: Primary | ICD-10-CM

## 2025-02-11 RX ORDER — METHYLPHENIDATE HYDROCHLORIDE 18 MG/1
18 TABLET ORAL EVERY MORNING
Qty: 30 TABLET | Refills: 0 | Status: SHIPPED | OUTPATIENT
Start: 2025-02-11

## 2025-02-11 NOTE — TELEPHONE ENCOUNTER
correspondence. Agreed to alternative medication as covered by insurance.     Diagnoses and all orders for this visit:    Attention deficit hyperactivity disorder (ADHD), unspecified ADHD type  -     methylphenidate HCl ER, OSM, (CONCERTA) 18 MG CR tablet; Take 1 tablet (18 mg) by mouth every morning.      Alexis Avila MD  11:13 AM, February 11, 2025

## 2025-03-12 ENCOUNTER — MYC REFILL (OUTPATIENT)
Dept: FAMILY MEDICINE | Facility: CLINIC | Age: 55
End: 2025-03-12

## 2025-03-12 DIAGNOSIS — F90.9 ATTENTION DEFICIT HYPERACTIVITY DISORDER (ADHD), UNSPECIFIED ADHD TYPE: ICD-10-CM

## 2025-03-13 RX ORDER — METHYLPHENIDATE HYDROCHLORIDE 36 MG/1
36 TABLET ORAL EVERY MORNING
Qty: 30 TABLET | Refills: 0 | Status: SHIPPED | OUTPATIENT
Start: 2025-03-13

## 2025-03-13 NOTE — TELEPHONE ENCOUNTER
reviewed, med refilled at increased dose as noted.     MARYA was seen today for refill request.    Diagnoses and all orders for this visit:    Attention deficit hyperactivity disorder (ADHD), unspecified ADHD type  -     methylphenidate HCl ER, OSM, (CONCERTA) 36 MG CR tablet; Take 1 tablet (36 mg) by mouth every morning.      Alexis Avila MD  12:48 PM, March 13, 2025

## 2025-04-22 ENCOUNTER — MYC REFILL (OUTPATIENT)
Dept: FAMILY MEDICINE | Facility: CLINIC | Age: 55
End: 2025-04-22

## 2025-04-22 DIAGNOSIS — F90.9 ATTENTION DEFICIT HYPERACTIVITY DISORDER (ADHD), UNSPECIFIED ADHD TYPE: ICD-10-CM

## 2025-04-22 RX ORDER — METHYLPHENIDATE HYDROCHLORIDE 36 MG/1
36 TABLET ORAL EVERY MORNING
Qty: 30 TABLET | Refills: 0 | Status: SHIPPED | OUTPATIENT
Start: 2025-04-22

## 2025-04-22 NOTE — TELEPHONE ENCOUNTER
Methylphenidate ER (Concerta CR) 36 mg    Last Office Visit: 1/6/25  [facility]  Medication last refilled: 3/13/25 #30 with 0 refill(s)     verified - last fill date: 3/22/25 #30    CSA on File - 1/6/25  Last [facility]-Tox - None    Routing refill request to provider for review/approval because:  Drug not on the [facility] refill protocol     IGNACIO ToureN, RN, CCM

## 2025-04-22 NOTE — TELEPHONE ENCOUNTER
reviewed, med refilled as noted.    MARYA was seen today for refill request.    Diagnoses and all orders for this visit:    Attention deficit hyperactivity disorder (ADHD), unspecified ADHD type  -     methylphenidate HCl ER, OSM, (CONCERTA) 36 MG CR tablet; Take 1 tablet (36 mg) by mouth every morning.      Alexis Avila MD  5:28 PM, April 22, 2025

## 2025-05-30 ENCOUNTER — HOSPITAL ENCOUNTER (OUTPATIENT)
Dept: GENERAL RADIOLOGY | Facility: CLINIC | Age: 55
Discharge: HOME | End: 2025-05-30
Attending: INTERNAL MEDICINE | Admitting: INTERNAL MEDICINE
Payer: COMMERCIAL

## 2025-05-30 PROCEDURE — 73130 X-RAY EXAM OF HAND: CPT | Mod: 50

## 2025-05-30 PROCEDURE — 73110 X-RAY EXAM OF WRIST: CPT | Mod: 50

## 2025-06-09 ENCOUNTER — MYC REFILL (OUTPATIENT)
Dept: FAMILY MEDICINE | Facility: CLINIC | Age: 55
End: 2025-06-09

## 2025-06-09 DIAGNOSIS — F90.9 ATTENTION DEFICIT HYPERACTIVITY DISORDER (ADHD), UNSPECIFIED ADHD TYPE: ICD-10-CM

## 2025-06-10 RX ORDER — METHYLPHENIDATE HYDROCHLORIDE 36 MG/1
36 TABLET ORAL EVERY MORNING
Qty: 30 TABLET | Refills: 0 | Status: SHIPPED | OUTPATIENT
Start: 2025-06-10

## 2025-06-10 NOTE — TELEPHONE ENCOUNTER
Medication requested: methylphenidate HCl ER, OSM, (CONCERTA) 36 MG CR tablet   Last office visit: 1/6/25  [facility] appointments: none  Medication last refilled: 4/22/25; 30 + 0 refills, last sold 4/26/25 per   Last qualifying labs: N/A    Routing refill request to provider for review/approval because:  Drug not on the [facility] refill protocol     Ellis CISNEROS, RN  06/10/25 3:19 PM

## 2025-06-10 NOTE — TELEPHONE ENCOUNTER
reviewed, med refilled as noted.     MARYA was seen today for refill request.    Diagnoses and all orders for this visit:    Attention deficit hyperactivity disorder (ADHD), unspecified ADHD type  -     methylphenidate HCl ER, OSM, (CONCERTA) 36 MG CR tablet; Take 1 tablet (36 mg) by mouth every morning.      Alexis Avila MD  5:26 PM, Jossy 10, 2025

## 2025-06-11 ENCOUNTER — MYC MEDICAL ADVICE (OUTPATIENT)
Dept: RHEUMATOLOGY | Facility: CLINIC | Age: 55
End: 2025-06-11
Payer: COMMERCIAL

## 2025-06-11 DIAGNOSIS — Z87.39 HISTORY OF RHEUMATOID ARTHRITIS: Primary | ICD-10-CM

## 2025-06-16 NOTE — TELEPHONE ENCOUNTER
Pt schedule for next available 10/29. Please advise if pt needs to be seen sooner for his Humira.

## 2025-06-17 NOTE — TELEPHONE ENCOUNTER
"Per Dr Wright, \"Is he taking Humira every month or every 2 weeks now? \"    He is taking it monthly, but would like the RX to continue to say every 2 weeks so if he needs to change back to that dosing he can.     Elisha Urbano RN    "

## 2025-06-17 NOTE — TELEPHONE ENCOUNTER
Patient is asking for refill on humira prefilled syringe.  RX marked as historical, patient has never filled this with us.     RX is pended.     Elisha Urbano RN

## 2025-07-17 ENCOUNTER — MYC REFILL (OUTPATIENT)
Dept: FAMILY MEDICINE | Facility: CLINIC | Age: 55
End: 2025-07-17

## 2025-07-17 DIAGNOSIS — F90.9 ATTENTION DEFICIT HYPERACTIVITY DISORDER (ADHD), UNSPECIFIED ADHD TYPE: ICD-10-CM

## 2025-07-17 RX ORDER — METHYLPHENIDATE HYDROCHLORIDE 36 MG/1
36 TABLET ORAL EVERY MORNING
Qty: 30 TABLET | Refills: 0 | Status: SHIPPED | OUTPATIENT
Start: 2025-07-17

## 2025-07-17 NOTE — TELEPHONE ENCOUNTER
reviewed, med refilled as noted.     MARYA was seen today for refill request.    Diagnoses and all orders for this visit:    Attention deficit hyperactivity disorder (ADHD), unspecified ADHD type  -     methylphenidate HCl ER, OSM, (CONCERTA) 36 MG CR tablet; Take 1 tablet (36 mg) by mouth every morning.      Alexis Avila MD  3:51 PM, July 17, 2025

## 2025-07-17 NOTE — TELEPHONE ENCOUNTER
Medication requested: methylphenidate HCl ER, OSM, (CONCERTA) 36 MG CR tablet   Last office visit: 1/6/25  [facility] appointments: none  Medication last refilled: 6/10/25; 30 + 0 refills, last sold 6/11/25 per   Last qualifying labs: N/A    Routing refill request to provider for review/approval because:  Drug not on the [facility] refill protocol     Ellis CISNEROS, RN  07/17/25 1:08 PM

## 2025-07-19 ENCOUNTER — HEALTH MAINTENANCE LETTER (OUTPATIENT)
Age: 55
End: 2025-07-19

## 2025-08-11 ENCOUNTER — OFFICE VISIT (OUTPATIENT)
Dept: FAMILY MEDICINE | Facility: CLINIC | Age: 55
End: 2025-08-11
Payer: COMMERCIAL

## 2025-08-11 VITALS
SYSTOLIC BLOOD PRESSURE: 113 MMHG | OXYGEN SATURATION: 99 % | HEIGHT: 70 IN | DIASTOLIC BLOOD PRESSURE: 74 MMHG | BODY MASS INDEX: 28.35 KG/M2 | WEIGHT: 198 LBS | TEMPERATURE: 97.9 F | HEART RATE: 72 BPM

## 2025-08-11 DIAGNOSIS — T78.40XD ALLERGIC REACTION, SUBSEQUENT ENCOUNTER: Primary | ICD-10-CM

## 2025-08-11 PROBLEM — H93.299 ABNORMAL AUDITORY PERCEPTION: Status: ACTIVE | Noted: 2025-08-11

## 2025-08-11 PROBLEM — H83.3X9 NOISE-INDUCED HEARING LOSS: Status: ACTIVE | Noted: 2025-08-11

## 2025-08-11 RX ORDER — EPINEPHRINE 0.3 MG/.3ML
0.3 INJECTION SUBCUTANEOUS PRN
Qty: 2 EACH | Refills: 0 | Status: SHIPPED | OUTPATIENT
Start: 2025-08-11

## 2025-08-11 ASSESSMENT — PAIN SCALES - GENERAL: PAINLEVEL_OUTOF10: NO PAIN (0)

## 2025-08-12 ENCOUNTER — PATIENT OUTREACH (OUTPATIENT)
Dept: CARE COORDINATION | Facility: CLINIC | Age: 55
End: 2025-08-12
Payer: COMMERCIAL

## 2025-08-14 ENCOUNTER — PATIENT OUTREACH (OUTPATIENT)
Dept: CARE COORDINATION | Facility: CLINIC | Age: 55
End: 2025-08-14
Payer: COMMERCIAL

## 2025-08-15 ENCOUNTER — TELEPHONE (OUTPATIENT)
Dept: RHEUMATOLOGY | Facility: CLINIC | Age: 55
End: 2025-08-15
Payer: COMMERCIAL

## 2025-08-21 ENCOUNTER — MYC REFILL (OUTPATIENT)
Dept: FAMILY MEDICINE | Facility: CLINIC | Age: 55
End: 2025-08-21

## 2025-08-21 DIAGNOSIS — F90.9 ATTENTION DEFICIT HYPERACTIVITY DISORDER (ADHD), UNSPECIFIED ADHD TYPE: ICD-10-CM

## 2025-08-21 RX ORDER — METHYLPHENIDATE HYDROCHLORIDE 36 MG/1
36 TABLET ORAL EVERY MORNING
Qty: 30 TABLET | Refills: 0 | Status: SHIPPED | OUTPATIENT
Start: 2025-08-21